# Patient Record
Sex: FEMALE | Race: WHITE | Employment: FULL TIME | URBAN - METROPOLITAN AREA
[De-identification: names, ages, dates, MRNs, and addresses within clinical notes are randomized per-mention and may not be internally consistent; named-entity substitution may affect disease eponyms.]

---

## 2017-02-27 ENCOUNTER — OFFICE VISIT (OUTPATIENT)
Dept: FAMILY MEDICINE CLINIC | Facility: CLINIC | Age: 57
End: 2017-02-27

## 2017-02-27 VITALS
HEIGHT: 64 IN | WEIGHT: 137 LBS | BODY MASS INDEX: 23.39 KG/M2 | TEMPERATURE: 99 F | SYSTOLIC BLOOD PRESSURE: 116 MMHG | HEART RATE: 68 BPM | DIASTOLIC BLOOD PRESSURE: 70 MMHG | RESPIRATION RATE: 18 BRPM

## 2017-02-27 DIAGNOSIS — I10 ESSENTIAL HYPERTENSION: ICD-10-CM

## 2017-02-27 DIAGNOSIS — M75.42 SHOULDER IMPINGEMENT SYNDROME, LEFT: ICD-10-CM

## 2017-02-27 DIAGNOSIS — Z13.0 SCREENING FOR ENDOCRINE, NUTRITIONAL, METABOLIC AND IMMUNITY DISORDER: ICD-10-CM

## 2017-02-27 DIAGNOSIS — Z13.21 SCREENING FOR ENDOCRINE, NUTRITIONAL, METABOLIC AND IMMUNITY DISORDER: ICD-10-CM

## 2017-02-27 DIAGNOSIS — Z13.228 SCREENING FOR ENDOCRINE, NUTRITIONAL, METABOLIC AND IMMUNITY DISORDER: ICD-10-CM

## 2017-02-27 DIAGNOSIS — Z12.31 VISIT FOR SCREENING MAMMOGRAM: ICD-10-CM

## 2017-02-27 DIAGNOSIS — Z13.29 SCREENING FOR ENDOCRINE, NUTRITIONAL, METABOLIC AND IMMUNITY DISORDER: ICD-10-CM

## 2017-02-27 DIAGNOSIS — Z00.00 ROUTINE GENERAL MEDICAL EXAMINATION AT A HEALTH CARE FACILITY: Primary | ICD-10-CM

## 2017-02-27 PROBLEM — M75.40 SHOULDER IMPINGEMENT SYNDROME: Status: ACTIVE | Noted: 2017-02-27

## 2017-02-27 PROCEDURE — 99396 PREV VISIT EST AGE 40-64: CPT | Performed by: FAMILY MEDICINE

## 2017-02-27 PROCEDURE — 99213 OFFICE O/P EST LOW 20 MIN: CPT | Performed by: FAMILY MEDICINE

## 2017-02-27 RX ORDER — VERAPAMIL HYDROCHLORIDE 120 MG/1
CAPSULE, EXTENDED RELEASE ORAL
Qty: 90 CAPSULE | Refills: 3 | Status: SHIPPED | OUTPATIENT
Start: 2017-02-27 | End: 2018-03-08

## 2017-02-27 RX ORDER — METHYLPREDNISOLONE 4 MG/1
TABLET ORAL
Qty: 1 KIT | Refills: 0 | Status: SHIPPED | OUTPATIENT
Start: 2017-02-27 | End: 2018-03-08 | Stop reason: ALTCHOICE

## 2017-02-27 NOTE — PATIENT INSTRUCTIONS
Shoulder Impingement Syndrome  The rotator cuff is a group of muscles and tendons that surround the shoulder joint. These muscles and tendons hold the arm in its joint. They help the shoulder move.  The rotator cuff muscles and tendons can become irritate · You may take acetaminophen or ibuprofen to control pain, unless another medicine was prescribed. If prednisone was prescribed, don’t take anti-inflammatory medicines.  If you have chronic liver or kidney disease or ever had a stomach ulcer or gastrointest

## 2017-02-27 NOTE — PROGRESS NOTES
Vinicio Ordaz is a 64year old female who presents for a complete physical exam, no gyn. HPI:     Patient presents with:  Physical      Patient feels well, dental visit up to date, no hearing problem. Vaccinations up to date.   Needs colonoscopy t any unusual skin lesions or rashes  EYES: no visual complaints or deficits  HEENT: denies nasal congestion, sinus pain or sore throat; hearing loss negative   RESPIRATORY: denies shortness of breath, wheezing or cough   CARDIOVASCULAR: denies chest pain, S visit:    Routine general medical examination at a health care facility    Screening for endocrine, nutritional, metabolic and immunity disorder  -     CBC W/DIFF; Future  -     LIPID PANEL; Future  -     VITAMIN D, 25-HYDROXY; Future  -     TSH;  Future  -

## 2017-03-29 ENCOUNTER — LAB ENCOUNTER (OUTPATIENT)
Dept: LAB | Age: 57
End: 2017-03-29
Attending: FAMILY MEDICINE
Payer: COMMERCIAL

## 2017-03-29 ENCOUNTER — HOSPITAL ENCOUNTER (OUTPATIENT)
Dept: MAMMOGRAPHY | Age: 57
Discharge: HOME | End: 2017-03-29
Attending: FAMILY MEDICINE
Payer: COMMERCIAL

## 2017-03-29 DIAGNOSIS — Z13.29 SCREENING FOR ENDOCRINE, NUTRITIONAL, METABOLIC AND IMMUNITY DISORDER: ICD-10-CM

## 2017-03-29 DIAGNOSIS — Z13.0 SCREENING FOR ENDOCRINE, NUTRITIONAL, METABOLIC AND IMMUNITY DISORDER: ICD-10-CM

## 2017-03-29 DIAGNOSIS — Z13.21 SCREENING FOR ENDOCRINE, NUTRITIONAL, METABOLIC AND IMMUNITY DISORDER: ICD-10-CM

## 2017-03-29 DIAGNOSIS — Z13.228 SCREENING FOR ENDOCRINE, NUTRITIONAL, METABOLIC AND IMMUNITY DISORDER: ICD-10-CM

## 2017-03-29 DIAGNOSIS — Z12.31 VISIT FOR SCREENING MAMMOGRAM: ICD-10-CM

## 2017-03-29 LAB
25-HYDROXYVITAMIN D (TOTAL): 19.7 NG/ML (ref 30–100)
ALBUMIN SERPL-MCNC: 4 G/DL (ref 3.5–4.8)
ALP LIVER SERPL-CCNC: 48 U/L (ref 46–118)
ALT SERPL-CCNC: 21 U/L (ref 14–54)
AST SERPL-CCNC: 21 U/L (ref 15–41)
BASOPHILS # BLD AUTO: 0.09 X10(3) UL (ref 0–0.1)
BASOPHILS NFR BLD AUTO: 1.8 %
BILIRUB SERPL-MCNC: 0.4 MG/DL (ref 0.1–2)
BUN BLD-MCNC: 16 MG/DL (ref 8–20)
CALCIUM BLD-MCNC: 8.9 MG/DL (ref 8.3–10.3)
CHLORIDE: 104 MMOL/L (ref 101–111)
CHOLEST SMN-MCNC: 192 MG/DL (ref ?–200)
CO2: 30 MMOL/L (ref 22–32)
CREAT BLD-MCNC: 0.81 MG/DL (ref 0.55–1.02)
EOSINOPHIL # BLD AUTO: 0.11 X10(3) UL (ref 0–0.3)
EOSINOPHIL NFR BLD AUTO: 2.2 %
ERYTHROCYTE [DISTWIDTH] IN BLOOD BY AUTOMATED COUNT: 14.1 % (ref 11.5–16)
GLUCOSE BLD-MCNC: 79 MG/DL (ref 70–99)
HCT VFR BLD AUTO: 41.6 % (ref 34–50)
HDLC SERPL-MCNC: 115 MG/DL (ref 45–?)
HDLC SERPL: 1.67 {RATIO} (ref ?–4.44)
HGB BLD-MCNC: 13.6 G/DL (ref 12–16)
IMMATURE GRANULOCYTE COUNT: 0.01 X10(3) UL (ref 0–1)
IMMATURE GRANULOCYTE RATIO %: 0.2 %
LDLC SERPL CALC-MCNC: 71 MG/DL (ref ?–130)
LYMPHOCYTES # BLD AUTO: 1.61 X10(3) UL (ref 0.9–4)
LYMPHOCYTES NFR BLD AUTO: 32.8 %
M PROTEIN MFR SERPL ELPH: 7.5 G/DL (ref 6.1–8.3)
MCH RBC QN AUTO: 30.5 PG (ref 27–33.2)
MCHC RBC AUTO-ENTMCNC: 32.7 G/DL (ref 31–37)
MCV RBC AUTO: 93.3 FL (ref 81–100)
MONOCYTES # BLD AUTO: 0.48 X10(3) UL (ref 0.1–0.6)
MONOCYTES NFR BLD AUTO: 9.8 %
NEUTROPHIL ABS PRELIM: 2.61 X10 (3) UL (ref 1.3–6.7)
NEUTROPHILS # BLD AUTO: 2.61 X10(3) UL (ref 1.3–6.7)
NEUTROPHILS NFR BLD AUTO: 53.2 %
NONHDLC SERPL-MCNC: 77 MG/DL (ref ?–130)
PLATELET # BLD AUTO: 257 10(3)UL (ref 150–450)
POTASSIUM SERPL-SCNC: 4.2 MMOL/L (ref 3.6–5.1)
RBC # BLD AUTO: 4.46 X10(6)UL (ref 3.8–5.1)
RED CELL DISTRIBUTION WIDTH-SD: 48.1 FL (ref 35.1–46.3)
SODIUM SERPL-SCNC: 140 MMOL/L (ref 136–144)
TRIGLYCERIDES: 31 MG/DL (ref ?–150)
TSI SER-ACNC: 1.6 MIU/ML (ref 0.35–5.5)
VLDL: 6 MG/DL (ref 5–40)
WBC # BLD AUTO: 4.9 X10(3) UL (ref 4–13)

## 2017-03-29 PROCEDURE — 84443 ASSAY THYROID STIM HORMONE: CPT

## 2017-03-29 PROCEDURE — 85025 COMPLETE CBC W/AUTO DIFF WBC: CPT

## 2017-03-29 PROCEDURE — 82306 VITAMIN D 25 HYDROXY: CPT

## 2017-03-29 PROCEDURE — 80061 LIPID PANEL: CPT

## 2017-03-29 PROCEDURE — 80053 COMPREHEN METABOLIC PANEL: CPT

## 2017-03-29 PROCEDURE — 77067 SCR MAMMO BI INCL CAD: CPT

## 2017-03-29 PROCEDURE — 36415 COLL VENOUS BLD VENIPUNCTURE: CPT

## 2017-03-31 ENCOUNTER — TELEPHONE (OUTPATIENT)
Dept: FAMILY MEDICINE CLINIC | Facility: CLINIC | Age: 57
End: 2017-03-31

## 2017-03-31 RX ORDER — ERGOCALCIFEROL 1.25 MG/1
50000 CAPSULE ORAL WEEKLY
Qty: 12 CAPSULE | Refills: 0 | Status: SHIPPED | OUTPATIENT
Start: 2017-03-31 | End: 2017-04-30

## 2017-03-31 NOTE — TELEPHONE ENCOUNTER
Psr;   Please ask patient what pharmacy she would like her high dose of vitamin D sent to. We show jerry and they are our of business. We will send the RX over once we get this information.   No need to forward to triage unless patient has further que

## 2017-03-31 NOTE — TELEPHONE ENCOUNTER
From voicemail: Pt called back, please send to [facility] [address] [place] [address], [address] [place], [place] [address]o [address] [place] [address], [phone], [phone]

## 2017-03-31 NOTE — TELEPHONE ENCOUNTER
----- Message from Aylin Manriquez MD sent at 3/29/2017  7:50 PM CDT -----  Vitamin D level is low. Please send Rx for 50,000U per week for 3 months.

## 2018-03-08 ENCOUNTER — OFFICE VISIT (OUTPATIENT)
Dept: FAMILY MEDICINE CLINIC | Facility: CLINIC | Age: 58
End: 2018-03-08

## 2018-03-08 VITALS
HEART RATE: 70 BPM | RESPIRATION RATE: 18 BRPM | DIASTOLIC BLOOD PRESSURE: 66 MMHG | SYSTOLIC BLOOD PRESSURE: 116 MMHG | BODY MASS INDEX: 23.56 KG/M2 | TEMPERATURE: 99 F | OXYGEN SATURATION: 99 % | HEIGHT: 64 IN | WEIGHT: 138 LBS

## 2018-03-08 DIAGNOSIS — Z13.228 SCREENING FOR ENDOCRINE, NUTRITIONAL, METABOLIC AND IMMUNITY DISORDER: ICD-10-CM

## 2018-03-08 DIAGNOSIS — Z00.00 ROUTINE GENERAL MEDICAL EXAMINATION AT A HEALTH CARE FACILITY: Primary | ICD-10-CM

## 2018-03-08 DIAGNOSIS — M72.2 PLANTAR FASCIITIS, LEFT: ICD-10-CM

## 2018-03-08 DIAGNOSIS — Z13.0 SCREENING FOR ENDOCRINE, NUTRITIONAL, METABOLIC AND IMMUNITY DISORDER: ICD-10-CM

## 2018-03-08 DIAGNOSIS — Z12.31 VISIT FOR SCREENING MAMMOGRAM: ICD-10-CM

## 2018-03-08 DIAGNOSIS — Z12.11 SCREENING FOR COLON CANCER: ICD-10-CM

## 2018-03-08 DIAGNOSIS — I10 ESSENTIAL HYPERTENSION: ICD-10-CM

## 2018-03-08 DIAGNOSIS — Z13.21 SCREENING FOR ENDOCRINE, NUTRITIONAL, METABOLIC AND IMMUNITY DISORDER: ICD-10-CM

## 2018-03-08 DIAGNOSIS — Z13.29 SCREENING FOR ENDOCRINE, NUTRITIONAL, METABOLIC AND IMMUNITY DISORDER: ICD-10-CM

## 2018-03-08 PROCEDURE — 99213 OFFICE O/P EST LOW 20 MIN: CPT | Performed by: FAMILY MEDICINE

## 2018-03-08 PROCEDURE — 99396 PREV VISIT EST AGE 40-64: CPT | Performed by: FAMILY MEDICINE

## 2018-03-08 PROCEDURE — 20550 NJX 1 TENDON SHEATH/LIGAMENT: CPT | Performed by: FAMILY MEDICINE

## 2018-03-08 RX ORDER — TRIAMCINOLONE ACETONIDE 40 MG/ML
40 INJECTION, SUSPENSION INTRA-ARTICULAR; INTRAMUSCULAR ONCE
Status: COMPLETED | OUTPATIENT
Start: 2018-03-08 | End: 2018-03-08

## 2018-03-08 RX ORDER — VERAPAMIL HYDROCHLORIDE 120 MG/1
CAPSULE, EXTENDED RELEASE ORAL
Qty: 90 CAPSULE | Refills: 4 | Status: SHIPPED | OUTPATIENT
Start: 2018-03-08 | End: 2019-05-09

## 2018-03-08 RX ADMIN — TRIAMCINOLONE ACETONIDE 40 MG: 40 INJECTION, SUSPENSION INTRA-ARTICULAR; INTRAMUSCULAR at 16:51:00

## 2018-03-08 NOTE — PROGRESS NOTES
René Cabrera is a 62year old female who presents for a complete physical exam, no gyn. HPI:     Patient presents with:  Physical      Patient feels well, dental visit up to date, no hearing problem. Vaccinations up to date. Pt has L heel pain. denies nausea, vomiting, constipation, diarrhea; no rectal bleeding; no heartburn  GENITAL/: no dysuria, urgency or frequency  MUSCULOSKELETAL: no other  joint complaints upper or lower extremities  NEURO: no sensory or motor complaint  HEMATOLOGY: denie Lexie Guzmán is a 62year old female who presents for a complete physical exam.   Pt's was recommended low fat diet and aerobic exercise 30 minutes three times weekly. Health maintenance.    Aida Arteaga was seen today for physical.    Diagnoses a no

## 2018-03-09 ENCOUNTER — TELEPHONE (OUTPATIENT)
Dept: FAMILY MEDICINE CLINIC | Facility: CLINIC | Age: 58
End: 2018-03-09

## 2018-03-09 DIAGNOSIS — Z12.11 SCREENING FOR COLON CANCER: Primary | ICD-10-CM

## 2018-03-09 NOTE — TELEPHONE ENCOUNTER
[facility] General Surgery:       Dr. Dollene Mohs    [address] [place] [address]  [place], [address]      [address]  [address]

## 2018-03-09 NOTE — TELEPHONE ENCOUNTER
Justina Morley - specialist name       specialist name    Justina Morley   Sent: Carmita Campos March 09, 2018  9:11 AM   To: Francisco Nieves 17 Clinical Staff                  Message     Good Morning      Specialist name required for authorization      Thank you      Where wou

## 2018-03-09 NOTE — TELEPHONE ENCOUNTER
Pt has seen Dr. Boom Torres for last Colonoscopy. I spoke with pt, she would like to see Dr. Luis Easley again for her Colonoscopy. Referral placed in [facility].

## 2018-03-29 DIAGNOSIS — I10 ESSENTIAL HYPERTENSION: ICD-10-CM

## 2018-03-29 RX ORDER — VERAPAMIL HYDROCHLORIDE 120 MG/1
CAPSULE, EXTENDED RELEASE ORAL
Qty: 90 CAPSULE | Refills: 3 | Status: SHIPPED | OUTPATIENT
Start: 2018-03-29 | End: 2018-05-23 | Stop reason: ALTCHOICE

## 2018-05-17 ENCOUNTER — LAB ENCOUNTER (OUTPATIENT)
Dept: LAB | Age: 58
End: 2018-05-17
Attending: FAMILY MEDICINE
Payer: COMMERCIAL

## 2018-05-17 DIAGNOSIS — Z13.0 SCREENING FOR ENDOCRINE, NUTRITIONAL, METABOLIC AND IMMUNITY DISORDER: ICD-10-CM

## 2018-05-17 DIAGNOSIS — Z00.00 ROUTINE GENERAL MEDICAL EXAMINATION AT A HEALTH CARE FACILITY: ICD-10-CM

## 2018-05-17 DIAGNOSIS — Z13.29 SCREENING FOR ENDOCRINE, NUTRITIONAL, METABOLIC AND IMMUNITY DISORDER: ICD-10-CM

## 2018-05-17 DIAGNOSIS — Z13.21 SCREENING FOR ENDOCRINE, NUTRITIONAL, METABOLIC AND IMMUNITY DISORDER: ICD-10-CM

## 2018-05-17 DIAGNOSIS — Z13.228 SCREENING FOR ENDOCRINE, NUTRITIONAL, METABOLIC AND IMMUNITY DISORDER: ICD-10-CM

## 2018-05-17 PROCEDURE — 86803 HEPATITIS C AB TEST: CPT

## 2018-05-17 PROCEDURE — 82306 VITAMIN D 25 HYDROXY: CPT

## 2018-05-17 PROCEDURE — 36415 COLL VENOUS BLD VENIPUNCTURE: CPT

## 2018-05-17 PROCEDURE — 80061 LIPID PANEL: CPT

## 2018-05-17 PROCEDURE — 84443 ASSAY THYROID STIM HORMONE: CPT

## 2018-05-17 PROCEDURE — 85025 COMPLETE CBC W/AUTO DIFF WBC: CPT

## 2018-05-17 PROCEDURE — 80053 COMPREHEN METABOLIC PANEL: CPT

## 2018-05-23 ENCOUNTER — OFFICE VISIT (OUTPATIENT)
Dept: FAMILY MEDICINE CLINIC | Facility: CLINIC | Age: 58
End: 2018-05-23

## 2018-05-23 VITALS
WEIGHT: 137 LBS | RESPIRATION RATE: 18 BRPM | OXYGEN SATURATION: 99 % | BODY MASS INDEX: 23.39 KG/M2 | HEART RATE: 68 BPM | SYSTOLIC BLOOD PRESSURE: 116 MMHG | DIASTOLIC BLOOD PRESSURE: 68 MMHG | TEMPERATURE: 99 F | HEIGHT: 64 IN

## 2018-05-23 DIAGNOSIS — M72.2 PLANTAR FASCIITIS OF LEFT FOOT: Primary | ICD-10-CM

## 2018-05-23 PROCEDURE — 99214 OFFICE O/P EST MOD 30 MIN: CPT | Performed by: FAMILY MEDICINE

## 2018-05-23 RX ORDER — METHYLPREDNISOLONE 4 MG/1
TABLET ORAL
Qty: 1 KIT | Refills: 0 | Status: SHIPPED | OUTPATIENT
Start: 2018-05-23 | End: 2019-05-28

## 2018-05-23 NOTE — PROGRESS NOTES
Patient presents with: Foot Pain: Lt Heel pain       HPI: Pt has L  heel pain.  Pt had for months, steroid shot helped a lot ,now mild to moderate, getting worse, sharp pain, worse in the morning and better during the day, worse with bearing weight and bet of insertion of plantar fascia on calcaneous of L foot. Miriam Gonzalez was seen today for foot pain.     Diagnoses and all orders for this visit:    Plantar fasciitis of left foot  -     PHYSICAL THERAPY EXTERNAL  -     methylPREDNISolone (MEDROL) 4

## 2019-05-01 ENCOUNTER — PATIENT OUTREACH (OUTPATIENT)
Dept: FAMILY MEDICINE CLINIC | Facility: CLINIC | Age: 59
End: 2019-05-01

## 2019-05-09 DIAGNOSIS — I10 ESSENTIAL HYPERTENSION: ICD-10-CM

## 2019-05-09 RX ORDER — VERAPAMIL HYDROCHLORIDE 120 MG/1
CAPSULE, EXTENDED RELEASE ORAL
Qty: 90 CAPSULE | Refills: 0 | Status: SHIPPED | OUTPATIENT
Start: 2019-05-09 | End: 2019-08-07

## 2019-05-09 NOTE — TELEPHONE ENCOUNTER
Medication(s) to Refill:   Requested Prescriptions     Pending Prescriptions Disp Refills   • VERAPAMIL HCL  MG Oral Capsule SR 24 Hr [Pharmacy Med Name: VERAPAMIL 120MG SR CAPSULES] 90 capsule 0     Sig: TAKE ONE CAPSULE BY MOUTH NIGHTLY         Olivier Adam

## 2019-05-28 ENCOUNTER — OFFICE VISIT (OUTPATIENT)
Dept: FAMILY MEDICINE CLINIC | Facility: CLINIC | Age: 59
End: 2019-05-28

## 2019-05-28 VITALS
HEIGHT: 64 IN | WEIGHT: 135 LBS | DIASTOLIC BLOOD PRESSURE: 80 MMHG | SYSTOLIC BLOOD PRESSURE: 110 MMHG | OXYGEN SATURATION: 98 % | RESPIRATION RATE: 16 BRPM | HEART RATE: 72 BPM | BODY MASS INDEX: 23.05 KG/M2 | TEMPERATURE: 99 F

## 2019-05-28 DIAGNOSIS — K12.2 UVULITIS: ICD-10-CM

## 2019-05-28 DIAGNOSIS — J02.9 ACUTE PHARYNGITIS, UNSPECIFIED ETIOLOGY: ICD-10-CM

## 2019-05-28 DIAGNOSIS — J02.9 SORE THROAT: Primary | ICD-10-CM

## 2019-05-28 PROCEDURE — 87081 CULTURE SCREEN ONLY: CPT | Performed by: NURSE PRACTITIONER

## 2019-05-28 PROCEDURE — 99213 OFFICE O/P EST LOW 20 MIN: CPT | Performed by: NURSE PRACTITIONER

## 2019-05-28 PROCEDURE — 87880 STREP A ASSAY W/OPTIC: CPT | Performed by: NURSE PRACTITIONER

## 2019-05-28 RX ORDER — AMOXICILLIN AND CLAVULANATE POTASSIUM 875; 125 MG/1; MG/1
1 TABLET, FILM COATED ORAL 2 TIMES DAILY
Qty: 14 TABLET | Refills: 0 | Status: SHIPPED | OUTPATIENT
Start: 2019-05-28 | End: 2019-06-04

## 2019-05-28 RX ORDER — IBUPROFEN 800 MG/1
800 TABLET ORAL EVERY 8 HOURS PRN
Qty: 60 TABLET | Refills: 0 | Status: SHIPPED | OUTPATIENT
Start: 2019-05-28 | End: 2019-08-14

## 2019-05-28 RX ORDER — PREDNISONE 20 MG/1
40 TABLET ORAL DAILY
Qty: 10 TABLET | Refills: 0 | Status: SHIPPED | OUTPATIENT
Start: 2019-05-28 | End: 2019-06-02

## 2019-05-28 NOTE — PROGRESS NOTES
CHIEF COMPLAINT:   Patient presents with:  Sore Throat      HPI:   Cuca Mijares is a 62year old female presents to clinic with symptoms of sore throat. Sore throat:  Patient has had for 3 days. Symptoms have been  worsening  since the onset.   Nadia without murmur  GI: good BS's,no masses, hepatosplenomegaly, or tenderness on direct palpation  EXTREMITIES: no cyanosis, clubbing or edema  LYMPH: mild tenderness to  anterior cervical lymph nodes . No  submandibular lymphadenopathy.   No posterior cervica

## 2019-08-07 DIAGNOSIS — I10 ESSENTIAL HYPERTENSION: ICD-10-CM

## 2019-08-12 RX ORDER — VERAPAMIL HYDROCHLORIDE 120 MG/1
CAPSULE, EXTENDED RELEASE ORAL
Qty: 90 CAPSULE | Refills: 0 | Status: SHIPPED | OUTPATIENT
Start: 2019-08-12 | End: 2019-08-14

## 2019-08-14 ENCOUNTER — OFFICE VISIT (OUTPATIENT)
Dept: FAMILY MEDICINE CLINIC | Facility: CLINIC | Age: 59
End: 2019-08-14

## 2019-08-14 VITALS
OXYGEN SATURATION: 98 % | HEART RATE: 64 BPM | SYSTOLIC BLOOD PRESSURE: 110 MMHG | WEIGHT: 132 LBS | BODY MASS INDEX: 22.53 KG/M2 | DIASTOLIC BLOOD PRESSURE: 80 MMHG | HEIGHT: 64 IN | RESPIRATION RATE: 16 BRPM

## 2019-08-14 DIAGNOSIS — Z12.39 SCREENING FOR BREAST CANCER: ICD-10-CM

## 2019-08-14 DIAGNOSIS — Z13.21 ENCOUNTER FOR VITAMIN DEFICIENCY SCREENING: ICD-10-CM

## 2019-08-14 DIAGNOSIS — Z00.00 ROUTINE GENERAL MEDICAL EXAMINATION AT A HEALTH CARE FACILITY: Primary | ICD-10-CM

## 2019-08-14 DIAGNOSIS — Z00.00 LABORATORY EXAM ORDERED AS PART OF ROUTINE GENERAL MEDICAL EXAMINATION: ICD-10-CM

## 2019-08-14 DIAGNOSIS — I10 ESSENTIAL HYPERTENSION: ICD-10-CM

## 2019-08-14 PROCEDURE — 99396 PREV VISIT EST AGE 40-64: CPT | Performed by: NURSE PRACTITIONER

## 2019-08-14 RX ORDER — VERAPAMIL HYDROCHLORIDE 120 MG/1
120 CAPSULE, EXTENDED RELEASE ORAL NIGHTLY
Qty: 90 CAPSULE | Refills: 3 | Status: SHIPPED | OUTPATIENT
Start: 2019-08-14 | End: 2020-12-07

## 2019-08-14 NOTE — PROGRESS NOTES
[facility]   PROGRESS NOTE  Chief Complaint:   Patient presents with:  Physical      HPI:   This is a 62year old female coming in for Physical   Physical: Patient 62 y female presents with comprehensive physical. Reports doing well , denies any dryness. CARDIOVASCULAR:  Denies chest pain, chest pressure, chest discomfort, palpitations, edema, dyspnea on exertion or at rest.  RESPIRATORY:  Denies shortness of breath, wheezing, cough or sputum.   GASTROINTESTINAL:  Denies abdominal pain, nausea, vo visit:  Diagnoses and all orders for this visit:    Routine general medical examination at a health care facility  Physical activity -30 min of exercise 3-5 days per week   Eat healthy diet that consists of whole grain , fruits , vegetables , lean meats . Essential hypertension     Routine general medical examination at a health care facility     Shoulder impingement syndrome     Plantar fasciitis of left foot      LIANE Bolanos  8/14/2019  4:15 PM

## 2020-06-24 ENCOUNTER — HOSPITAL ENCOUNTER (OUTPATIENT)
Dept: MAMMOGRAPHY | Age: 60
Discharge: HOME | End: 2020-06-24
Attending: NURSE PRACTITIONER
Payer: COMMERCIAL

## 2020-06-24 DIAGNOSIS — Z12.39 SCREENING FOR BREAST CANCER: ICD-10-CM

## 2020-06-24 PROCEDURE — 77067 SCR MAMMO BI INCL CAD: CPT | Performed by: NURSE PRACTITIONER

## 2020-06-24 PROCEDURE — 77063 BREAST TOMOSYNTHESIS BI: CPT | Performed by: NURSE PRACTITIONER

## 2020-09-23 ENCOUNTER — PATIENT MESSAGE (OUTPATIENT)
Dept: FAMILY MEDICINE CLINIC | Facility: CLINIC | Age: 60
End: 2020-09-23

## 2020-09-24 ENCOUNTER — ANESTHESIA EVENT (OUTPATIENT)
Dept: SURGERY | Facility: HOSPITAL | Age: 60
End: 2020-09-24
Payer: COMMERCIAL

## 2020-09-24 ENCOUNTER — HOSPITAL ENCOUNTER (EMERGENCY)
Facility: HOSPITAL | Age: 60
Discharge: HOME | End: 2020-09-24
Attending: EMERGENCY MEDICINE | Admitting: EMERGENCY MEDICINE
Payer: COMMERCIAL

## 2020-09-24 ENCOUNTER — APPOINTMENT (OUTPATIENT)
Dept: CT IMAGING | Age: 60
End: 2020-09-24
Attending: EMERGENCY MEDICINE
Payer: COMMERCIAL

## 2020-09-24 ENCOUNTER — ANESTHESIA (OUTPATIENT)
Dept: SURGERY | Facility: HOSPITAL | Age: 60
End: 2020-09-24
Payer: COMMERCIAL

## 2020-09-24 ENCOUNTER — APPOINTMENT (OUTPATIENT)
Dept: GENERAL RADIOLOGY | Age: 60
End: 2020-09-24
Attending: EMERGENCY MEDICINE
Payer: COMMERCIAL

## 2020-09-24 ENCOUNTER — APPOINTMENT (OUTPATIENT)
Dept: GENERAL RADIOLOGY | Facility: HOSPITAL | Age: 60
End: 2020-09-24
Attending: SURGERY
Payer: COMMERCIAL

## 2020-09-24 VITALS
DIASTOLIC BLOOD PRESSURE: 74 MMHG | OXYGEN SATURATION: 100 % | HEART RATE: 93 BPM | BODY MASS INDEX: 22.32 KG/M2 | RESPIRATION RATE: 18 BRPM | SYSTOLIC BLOOD PRESSURE: 127 MMHG | TEMPERATURE: 100 F | WEIGHT: 130.75 LBS | HEIGHT: 64 IN

## 2020-09-24 DIAGNOSIS — K81.9 CHOLECYSTITIS: ICD-10-CM

## 2020-09-24 DIAGNOSIS — K81.0 ACUTE CHOLECYSTITIS: Primary | ICD-10-CM

## 2020-09-24 PROBLEM — R73.9 HYPERGLYCEMIA: Status: ACTIVE | Noted: 2020-09-24

## 2020-09-24 LAB
ALBUMIN SERPL-MCNC: 3.9 G/DL (ref 3.4–5)
ALBUMIN/GLOB SERPL: 1 {RATIO} (ref 1–2)
ALP LIVER SERPL-CCNC: 43 U/L
ALT SERPL-CCNC: 19 U/L
ANION GAP SERPL CALC-SCNC: 6 MMOL/L (ref 0–18)
AST SERPL-CCNC: 19 U/L (ref 15–37)
ATRIAL RATE: 78 BPM
BASOPHILS # BLD AUTO: 0.07 X10(3) UL (ref 0–0.2)
BASOPHILS NFR BLD AUTO: 1 %
BILIRUB SERPL-MCNC: 0.5 MG/DL (ref 0.1–2)
BUN BLD-MCNC: 9 MG/DL (ref 7–18)
BUN/CREAT SERPL: 12 (ref 10–20)
CALCIUM BLD-MCNC: 8.8 MG/DL (ref 8.5–10.1)
CHLORIDE SERPL-SCNC: 107 MMOL/L (ref 98–112)
CO2 SERPL-SCNC: 26 MMOL/L (ref 21–32)
CREAT BLD-MCNC: 0.75 MG/DL
DEPRECATED RDW RBC AUTO: 42.5 FL (ref 35.1–46.3)
EOSINOPHIL # BLD AUTO: 0.04 X10(3) UL (ref 0–0.7)
EOSINOPHIL NFR BLD AUTO: 0.6 %
ERYTHROCYTE [DISTWIDTH] IN BLOOD BY AUTOMATED COUNT: 13 % (ref 11–15)
GLOBULIN PLAS-MCNC: 3.8 G/DL (ref 2.8–4.4)
GLUCOSE BLD-MCNC: 111 MG/DL (ref 70–99)
HCT VFR BLD AUTO: 38.3 %
HGB BLD-MCNC: 13 G/DL
IMM GRANULOCYTES # BLD AUTO: 0.01 X10(3) UL (ref 0–1)
IMM GRANULOCYTES NFR BLD: 0.1 %
LIPASE SERPL-CCNC: 184 U/L (ref 73–393)
LYMPHOCYTES # BLD AUTO: 1.38 X10(3) UL (ref 1–4)
LYMPHOCYTES NFR BLD AUTO: 20.2 %
M PROTEIN MFR SERPL ELPH: 7.7 G/DL (ref 6.4–8.2)
MCH RBC QN AUTO: 30.4 PG (ref 26–34)
MCHC RBC AUTO-ENTMCNC: 33.9 G/DL (ref 31–37)
MCV RBC AUTO: 89.5 FL
MONOCYTES # BLD AUTO: 0.53 X10(3) UL (ref 0.1–1)
MONOCYTES NFR BLD AUTO: 7.8 %
NEUTROPHILS # BLD AUTO: 4.8 X10 (3) UL (ref 1.5–7.7)
NEUTROPHILS # BLD AUTO: 4.8 X10(3) UL (ref 1.5–7.7)
NEUTROPHILS NFR BLD AUTO: 70.3 %
OSMOLALITY SERPL CALC.SUM OF ELEC: 287 MOSM/KG (ref 275–295)
P AXIS: 75 DEGREES
P-R INTERVAL: 146 MS
PLATELET # BLD AUTO: 239 10(3)UL (ref 150–450)
POTASSIUM SERPL-SCNC: 3.9 MMOL/L (ref 3.5–5.1)
Q-T INTERVAL: 378 MS
QRS DURATION: 86 MS
QTC CALCULATION (BEZET): 430 MS
R AXIS: 73 DEGREES
RBC # BLD AUTO: 4.28 X10(6)UL
SARS-COV-2 RNA RESP QL NAA+PROBE: NOT DETECTED
SODIUM SERPL-SCNC: 139 MMOL/L (ref 136–145)
T AXIS: 56 DEGREES
TROPONIN I SERPL-MCNC: <0.045 NG/ML (ref ?–0.04)
VENTRICULAR RATE: 78 BPM
WBC # BLD AUTO: 6.8 X10(3) UL (ref 4–11)

## 2020-09-24 PROCEDURE — 47563 LAPARO CHOLECYSTECTOMY/GRAPH: CPT | Performed by: PHYSICIAN ASSISTANT

## 2020-09-24 PROCEDURE — 71045 X-RAY EXAM CHEST 1 VIEW: CPT | Performed by: EMERGENCY MEDICINE

## 2020-09-24 PROCEDURE — 0FT44ZZ RESECTION OF GALLBLADDER, PERCUTANEOUS ENDOSCOPIC APPROACH: ICD-10-PCS | Performed by: SURGERY

## 2020-09-24 PROCEDURE — 47563 LAPARO CHOLECYSTECTOMY/GRAPH: CPT | Performed by: SURGERY

## 2020-09-24 PROCEDURE — BF141ZZ FLUOROSCOPY OF GALLBLADDER, BILE DUCTS AND PANCREATIC DUCTS USING LOW OSMOLAR CONTRAST: ICD-10-PCS | Performed by: SURGERY

## 2020-09-24 PROCEDURE — 74177 CT ABD & PELVIS W/CONTRAST: CPT | Performed by: EMERGENCY MEDICINE

## 2020-09-24 PROCEDURE — 99284 EMERGENCY DEPT VISIT MOD MDM: CPT | Performed by: SURGERY

## 2020-09-24 PROCEDURE — 76000 FLUOROSCOPY <1 HR PHYS/QHP: CPT | Performed by: SURGERY

## 2020-09-24 RX ORDER — NALOXONE HYDROCHLORIDE 0.4 MG/ML
80 INJECTION, SOLUTION INTRAMUSCULAR; INTRAVENOUS; SUBCUTANEOUS AS NEEDED
Status: DISCONTINUED | OUTPATIENT
Start: 2020-09-24 | End: 2020-09-24

## 2020-09-24 RX ORDER — DIPHENHYDRAMINE HYDROCHLORIDE 50 MG/ML
12.5 INJECTION INTRAMUSCULAR; INTRAVENOUS AS NEEDED
Status: DISCONTINUED | OUTPATIENT
Start: 2020-09-24 | End: 2020-09-24

## 2020-09-24 RX ORDER — SODIUM CHLORIDE 9 MG/ML
INJECTION, SOLUTION INTRAVENOUS CONTINUOUS
Status: CANCELLED | OUTPATIENT
Start: 2020-09-24 | End: 2020-09-24

## 2020-09-24 RX ORDER — HEPARIN SODIUM 5000 [USP'U]/ML
INJECTION, SOLUTION INTRAVENOUS; SUBCUTANEOUS
Status: COMPLETED
Start: 2020-09-24 | End: 2020-09-24

## 2020-09-24 RX ORDER — SODIUM CHLORIDE, SODIUM LACTATE, POTASSIUM CHLORIDE, CALCIUM CHLORIDE 600; 310; 30; 20 MG/100ML; MG/100ML; MG/100ML; MG/100ML
INJECTION, SOLUTION INTRAVENOUS CONTINUOUS
Status: DISCONTINUED | OUTPATIENT
Start: 2020-09-24 | End: 2020-09-24

## 2020-09-24 RX ORDER — ACETAMINOPHEN 500 MG
1000 TABLET ORAL ONCE
Status: COMPLETED | OUTPATIENT
Start: 2020-09-24 | End: 2020-09-24

## 2020-09-24 RX ORDER — ONDANSETRON 2 MG/ML
INJECTION INTRAMUSCULAR; INTRAVENOUS AS NEEDED
Status: DISCONTINUED | OUTPATIENT
Start: 2020-09-24 | End: 2020-09-24 | Stop reason: SURG

## 2020-09-24 RX ORDER — MEPERIDINE HYDROCHLORIDE 25 MG/ML
INJECTION INTRAMUSCULAR; INTRAVENOUS; SUBCUTANEOUS
Status: COMPLETED
Start: 2020-09-24 | End: 2020-09-24

## 2020-09-24 RX ORDER — METOCLOPRAMIDE HYDROCHLORIDE 5 MG/ML
INJECTION INTRAMUSCULAR; INTRAVENOUS
Status: COMPLETED
Start: 2020-09-24 | End: 2020-09-24

## 2020-09-24 RX ORDER — KETOROLAC TROMETHAMINE 30 MG/ML
INJECTION, SOLUTION INTRAMUSCULAR; INTRAVENOUS AS NEEDED
Status: DISCONTINUED | OUTPATIENT
Start: 2020-09-24 | End: 2020-09-24 | Stop reason: SURG

## 2020-09-24 RX ORDER — CEFOXITIN 2 G/1
INJECTION, POWDER, FOR SOLUTION INTRAVENOUS AS NEEDED
Status: DISCONTINUED | OUTPATIENT
Start: 2020-09-24 | End: 2020-09-24 | Stop reason: SURG

## 2020-09-24 RX ORDER — BUPIVACAINE HYDROCHLORIDE AND EPINEPHRINE 5; 5 MG/ML; UG/ML
INJECTION, SOLUTION EPIDURAL; INTRACAUDAL; PERINEURAL AS NEEDED
Status: DISCONTINUED | OUTPATIENT
Start: 2020-09-24 | End: 2020-09-24 | Stop reason: HOSPADM

## 2020-09-24 RX ORDER — HYDROMORPHONE HYDROCHLORIDE 1 MG/ML
0.5 INJECTION, SOLUTION INTRAMUSCULAR; INTRAVENOUS; SUBCUTANEOUS EVERY 30 MIN PRN
Status: CANCELLED | OUTPATIENT
Start: 2020-09-24 | End: 2020-09-24

## 2020-09-24 RX ORDER — HYDROCODONE BITARTRATE AND ACETAMINOPHEN 5; 325 MG/1; MG/1
1 TABLET ORAL AS NEEDED
Status: DISCONTINUED | OUTPATIENT
Start: 2020-09-24 | End: 2020-09-24

## 2020-09-24 RX ORDER — LABETALOL HYDROCHLORIDE 5 MG/ML
5 INJECTION, SOLUTION INTRAVENOUS EVERY 5 MIN PRN
Status: DISCONTINUED | OUTPATIENT
Start: 2020-09-24 | End: 2020-09-24

## 2020-09-24 RX ORDER — GLYCOPYRROLATE 0.2 MG/ML
INJECTION, SOLUTION INTRAMUSCULAR; INTRAVENOUS AS NEEDED
Status: DISCONTINUED | OUTPATIENT
Start: 2020-09-24 | End: 2020-09-24 | Stop reason: SURG

## 2020-09-24 RX ORDER — SCOLOPAMINE TRANSDERMAL SYSTEM 1 MG/1
PATCH, EXTENDED RELEASE TRANSDERMAL AS NEEDED
Status: DISCONTINUED | OUTPATIENT
Start: 2020-09-24 | End: 2020-09-24 | Stop reason: SURG

## 2020-09-24 RX ORDER — METOCLOPRAMIDE HYDROCHLORIDE 5 MG/ML
10 INJECTION INTRAMUSCULAR; INTRAVENOUS AS NEEDED
Status: DISCONTINUED | OUTPATIENT
Start: 2020-09-24 | End: 2020-09-24

## 2020-09-24 RX ORDER — HYDROMORPHONE HYDROCHLORIDE 1 MG/ML
INJECTION, SOLUTION INTRAMUSCULAR; INTRAVENOUS; SUBCUTANEOUS
Status: COMPLETED
Start: 2020-09-24 | End: 2020-09-24

## 2020-09-24 RX ORDER — HYDROCODONE BITARTRATE AND ACETAMINOPHEN 5; 325 MG/1; MG/1
2 TABLET ORAL AS NEEDED
Status: DISCONTINUED | OUTPATIENT
Start: 2020-09-24 | End: 2020-09-24

## 2020-09-24 RX ORDER — NEOSTIGMINE METHYLSULFATE 1 MG/ML
INJECTION INTRAVENOUS AS NEEDED
Status: DISCONTINUED | OUTPATIENT
Start: 2020-09-24 | End: 2020-09-24 | Stop reason: SURG

## 2020-09-24 RX ORDER — ONDANSETRON 2 MG/ML
4 INJECTION INTRAMUSCULAR; INTRAVENOUS EVERY 4 HOURS PRN
Status: CANCELLED | OUTPATIENT
Start: 2020-09-24

## 2020-09-24 RX ORDER — ONDANSETRON 2 MG/ML
4 INJECTION INTRAMUSCULAR; INTRAVENOUS AS NEEDED
Status: DISCONTINUED | OUTPATIENT
Start: 2020-09-24 | End: 2020-09-24

## 2020-09-24 RX ORDER — HYDROCODONE BITARTRATE AND ACETAMINOPHEN 5; 325 MG/1; MG/1
1 TABLET ORAL EVERY 6 HOURS PRN
Qty: 20 TABLET | Refills: 0 | Status: SHIPPED | OUTPATIENT
Start: 2020-09-24 | End: 2020-10-01 | Stop reason: ALTCHOICE

## 2020-09-24 RX ORDER — LIDOCAINE HYDROCHLORIDE 40 MG/ML
INJECTION, SOLUTION RETROBULBAR; TOPICAL AS NEEDED
Status: DISCONTINUED | OUTPATIENT
Start: 2020-09-24 | End: 2020-09-24 | Stop reason: SURG

## 2020-09-24 RX ORDER — DEXAMETHASONE SODIUM PHOSPHATE 4 MG/ML
VIAL (ML) INJECTION AS NEEDED
Status: DISCONTINUED | OUTPATIENT
Start: 2020-09-24 | End: 2020-09-24 | Stop reason: SURG

## 2020-09-24 RX ORDER — MEPERIDINE HYDROCHLORIDE 25 MG/ML
12.5 INJECTION INTRAMUSCULAR; INTRAVENOUS; SUBCUTANEOUS AS NEEDED
Status: DISCONTINUED | OUTPATIENT
Start: 2020-09-24 | End: 2020-09-24

## 2020-09-24 RX ORDER — ROCURONIUM BROMIDE 10 MG/ML
INJECTION, SOLUTION INTRAVENOUS AS NEEDED
Status: DISCONTINUED | OUTPATIENT
Start: 2020-09-24 | End: 2020-09-24 | Stop reason: SURG

## 2020-09-24 RX ORDER — ONDANSETRON 2 MG/ML
INJECTION INTRAMUSCULAR; INTRAVENOUS
Status: COMPLETED
Start: 2020-09-24 | End: 2020-09-24

## 2020-09-24 RX ORDER — HYDROMORPHONE HYDROCHLORIDE 1 MG/ML
0.4 INJECTION, SOLUTION INTRAMUSCULAR; INTRAVENOUS; SUBCUTANEOUS EVERY 5 MIN PRN
Status: DISCONTINUED | OUTPATIENT
Start: 2020-09-24 | End: 2020-09-24

## 2020-09-24 RX ORDER — HEPARIN SODIUM 5000 [USP'U]/ML
5000 INJECTION, SOLUTION INTRAVENOUS; SUBCUTANEOUS ONCE
Status: DISCONTINUED | OUTPATIENT
Start: 2020-09-24 | End: 2020-09-24 | Stop reason: HOSPADM

## 2020-09-24 RX ADMIN — CEFOXITIN 2 G: 2 INJECTION, POWDER, FOR SOLUTION INTRAVENOUS at 15:58:00

## 2020-09-24 RX ADMIN — KETOROLAC TROMETHAMINE 30 MG: 30 INJECTION, SOLUTION INTRAMUSCULAR; INTRAVENOUS at 17:04:00

## 2020-09-24 RX ADMIN — SODIUM CHLORIDE, SODIUM LACTATE, POTASSIUM CHLORIDE, CALCIUM CHLORIDE: 600; 310; 30; 20 INJECTION, SOLUTION INTRAVENOUS at 17:01:00

## 2020-09-24 RX ADMIN — DEXAMETHASONE SODIUM PHOSPHATE 8 MG: 4 MG/ML VIAL (ML) INJECTION at 15:52:00

## 2020-09-24 RX ADMIN — LIDOCAINE HYDROCHLORIDE 100 MG: 40 INJECTION, SOLUTION RETROBULBAR; TOPICAL at 15:48:00

## 2020-09-24 RX ADMIN — ROCURONIUM BROMIDE 40 MG: 10 INJECTION, SOLUTION INTRAVENOUS at 15:48:00

## 2020-09-24 RX ADMIN — NEOSTIGMINE METHYLSULFATE 4 MG: 1 INJECTION INTRAVENOUS at 17:05:00

## 2020-09-24 RX ADMIN — ONDANSETRON 4 MG: 2 INJECTION INTRAMUSCULAR; INTRAVENOUS at 17:04:00

## 2020-09-24 RX ADMIN — SODIUM CHLORIDE, SODIUM LACTATE, POTASSIUM CHLORIDE, CALCIUM CHLORIDE: 600; 310; 30; 20 INJECTION, SOLUTION INTRAVENOUS at 17:02:00

## 2020-09-24 RX ADMIN — SCOLOPAMINE TRANSDERMAL SYSTEM 1 PATCH: 1 PATCH, EXTENDED RELEASE TRANSDERMAL at 15:49:00

## 2020-09-24 RX ADMIN — GLYCOPYRROLATE 0.7 MG: 0.2 INJECTION, SOLUTION INTRAMUSCULAR; INTRAVENOUS at 17:05:00

## 2020-09-24 NOTE — ANESTHESIA POSTPROCEDURE EVALUATION
[address] [facility] Patient Status:  Emergency   Age/Gender 61year old female MRN [id number]   Location [facility] Attending Susan Ritchie MD   Hosp Day # 0 PCP Rocco Diaz MD       Anesthesia Pos

## 2020-09-24 NOTE — ED NOTES
Pt to go directly to OR holding per nursing supervisor. Pt requests that her  drive her to [facility] Dr. Radha Best. Pt and family updated on POC. IV abx infusing at this time.

## 2020-09-24 NOTE — ED PROVIDER NOTES
Patient Seen in: [facility] [place]      History   Patient presents with:  Abdomen/Flank Pain    Stated Complaint: epigastric pain    HPI    This is a very pleasant 51-year-old female who appears younger than her stated age who pre Temp 98.3 °F (36.8 °C) (Temporal)   Resp 20   Ht 162.6 cm (5' 4\")   Wt 59.3 kg   SpO2 98%   BMI 22.44 kg/m²         Physical Exam  General: This a pleasant nontoxic appearing patient in no apparent distress alert and oriented ×3  HEENT: Pupils are equal r normal EKG           Narrative & Impression     PROCEDURE:  XR CHEST AP PORTABLE  (CPT=71045)     TECHNIQUE:  AP chest radiograph was obtained. COMPARISON:  None.      INDICATIONS:  epigastric pain     PATIENT STATED HISTORY: (As transcribed by Burak Jett A representative cyst in the right kidney measures 5 mm. Bilateral extrarenal collecting systems demonstrate fullness. ADRENALS:  No mass or enlargement. AORTA/VASCULAR:  No aneurysm or dissection. RETROPERITONEUM:  No mass or adenopathy.     BOW showed no acute pathology. There is no pneumothorax or infiltrative process. CAT scan showed abnormal gallbladder fundal wall thickening. There were calcified gallstones in the gallbladder noted. Cholecystitis cannot be excluded.   I spoke with the patsy

## 2020-09-24 NOTE — ANESTHESIA PREPROCEDURE EVALUATION
PRE-OP EVALUATION    Patient Name: Idania Wilson    Pre-op Diagnosis: Cholecystitis [K81.9]    Procedure(s):  LAPAROSCOPIC CHOLECYSTECTOMY WITH INTRAOPERATIVE CHOLANGIOGRAM    Surgeon(s) and Role:     * Eileen Ferris MD - Primary    Pre-op vital PRN    •  ondansetron HCl (ZOFRAN) injection 4 mg, 4 mg, Intravenous, PRN    •  Metoclopramide HCl (REGLAN) injection 10 mg, 10 mg, Intravenous, PRN    •  Trimethobenzamide HCl (TIGAN) injection 200 mg, 200 mg, Intramuscular, PRN    •  cefOXitin Sodium (ME Neuro/Psych                   (+) headaches                 Past Surgical History:   Procedure Laterality Date   • COLONOSCOPY  2013    Dr. Jazzmine Barahona   • HYSTERECTOMY  1/1/96   • TONEY BIOPSY STEREO NODULE 1 SITE LEFT (CPT=19081)  2014    benign   • TONEY BIOPSY ST

## 2020-09-24 NOTE — ANESTHESIA PROCEDURE NOTES
Airway  Date/Time: 9/24/2020 3:48 PM  Urgency: elective      General Information and Staff    Patient location during procedure: OR  Anesthesiologist: Reji Donohue MD    Indications and Patient Condition  Indications for airway management: anesthesia

## 2020-09-24 NOTE — OPERATIVE REPORT
OPERATIVE REPORT   PREOPERATIVE DIAGNOSIS: Cholecystitis and cholelithiasis. POSTOPERATIVE DIAGNOSIS: Cholecystitis and cholelithiasis.    PROCEDURE PERFORMED: Laparoscopic cholecystectomy with intraoperative cholangiogram.   ASSISTANT: Ms Crystal Redman. conduct of this case, particularly in the performance of the cholangiogram, as well as the careful dissection necessary in and around the hilum of the gallbladder.    DESCRIPTION OF PROCEDURE: The patient was brought to the operating room, and, after the in artery on the specimen side, three towards the patient side, and the cystic artery was divided with the Endoshears. Next, the gallbladder was elevated from the gallbladder fossa using electrocautery.  Once the gallbladder was elevated from the gallbladder f

## 2020-09-24 NOTE — BRIEF OP NOTE
Pre-Operative Diagnosis: Acute Cholecystitis      Post-Operative Diagnosis: Acute Cholecystitis       Procedure Performed:   Procedure(s):  LAPAROSCOPIC CHOLECYSTECTOMY WITH INTRAOPERATIVE CHOLANGIOGRAM    Surgeon(s) and Role:     * Marlena Johnson MD -

## 2020-09-24 NOTE — TELEPHONE ENCOUNTER
From: René Cabrera  To: Isidoro Alvarez MD  Sent: 9/23/2020 1:51 PM CDT  Subject: Non-Urgent Medical Question    I was trying to make an appt. for the near future with you and the first opening isn't until December.  I am experiencing uncomfortable

## 2020-09-24 NOTE — H&P
5 [facility] Patient Status:  Emergency    1960 MRN [id number]   Location [address] [place] [address] Attending Dominguez Aguilar MD   Hosp Day # 0 PCP Rocco Diaz MD     History of Pres Cancer Maternal Grandmother 79   • Other (chronic kidney disease) Sister       reports that she has never smoked. She has never used smokeless tobacco. She reports that she does not drink alcohol or use drugs.     Allergies:    Anesthesia S-I-40 [*        H  09/24/2020    CO2 26.0 09/24/2020     09/24/2020    CA 8.8 09/24/2020    ALB 3.9 09/24/2020    ALKPHO 43 09/24/2020    BILT 0.5 09/24/2020    TP 7.7 09/24/2020    AST 19 09/24/2020    ALT 19 09/24/2020     09/24/2020    TROP <0.045

## 2020-10-01 ENCOUNTER — VIRTUAL PHONE E/M (OUTPATIENT)
Dept: SURGERY | Facility: CLINIC | Age: 60
End: 2020-10-01

## 2020-10-01 DIAGNOSIS — K81.0 ACUTE CHOLECYSTITIS: Primary | ICD-10-CM

## 2020-10-01 PROCEDURE — 99024 POSTOP FOLLOW-UP VISIT: CPT | Performed by: PHYSICIAN ASSISTANT

## 2020-10-01 NOTE — PROGRESS NOTES
Virtual/Telephone Check-In    Munir Napier verbally consents to a Virtual/Telephone Check-In service on 10/01/20. Patient has been referred to the [facility] website at [email or web address] to review the yearly Consent to Treat document.     I contact an as-needed basis. This patient should return urgently for any problems or complications related to the surgical intervention. The patient voiced understanding and agreement with this plan.     Debra Del Rosario, PA-C  10/1/2020

## 2020-10-21 ENCOUNTER — PATIENT MESSAGE (OUTPATIENT)
Dept: SURGERY | Facility: CLINIC | Age: 60
End: 2020-10-21

## 2020-10-21 RX ORDER — CHOLESTYRAMINE 4 G/9G
4 POWDER, FOR SUSPENSION ORAL DAILY
Qty: 30 PACKET | Refills: 2 | Status: SHIPPED | OUTPATIENT
Start: 2020-10-21

## 2020-10-21 NOTE — TELEPHONE ENCOUNTER
Gita Pittman  You 3 hours ago (11:25 AM)     Deborrcalvin Duran,     Go ahead and prescribe her Questran. Can you please let her know that this is in the same family as the medication she mentioned, but this is our preferred medication to start with.  She can ha

## 2020-10-21 NOTE — TELEPHONE ENCOUNTER
From: Alana Sayres  To: OUMAR Childs  Sent: 10/21/2020 8:41 AM CDT  Subject: Prescription Question    I had my gallbladder removed on 9- and am having some issues with frequent bowel movements and sometimes it is diarrhea.  I feel reall

## 2020-12-07 DIAGNOSIS — I10 ESSENTIAL HYPERTENSION: ICD-10-CM

## 2020-12-07 RX ORDER — VERAPAMIL HYDROCHLORIDE 120 MG/1
120 CAPSULE, EXTENDED RELEASE ORAL NIGHTLY
Qty: 90 CAPSULE | Refills: 0 | Status: SHIPPED | OUTPATIENT
Start: 2020-12-07 | End: 2021-03-11

## 2020-12-07 NOTE — TELEPHONE ENCOUNTER
Medication(s) to Refill:   Requested Prescriptions     Pending Prescriptions Disp Refills   • Verapamil HCl  MG Oral Capsule SR 24 Hr 90 capsule 0     Sig: Take 1 capsule (120 mg total) by mouth nightly.          Reason for Medication Refill being sen

## 2021-03-11 DIAGNOSIS — I10 ESSENTIAL HYPERTENSION: ICD-10-CM

## 2021-03-11 RX ORDER — VERAPAMIL HYDROCHLORIDE 120 MG/1
CAPSULE, EXTENDED RELEASE ORAL
Qty: 90 CAPSULE | Refills: 0 | Status: SHIPPED | OUTPATIENT
Start: 2021-03-11 | End: 2021-06-15

## 2021-03-11 NOTE — TELEPHONE ENCOUNTER
Medication(s) to Refill:   Requested Prescriptions     Pending Prescriptions Disp Refills   • VERAPAMIL HCL  MG Oral Capsule SR 24 Hr [Pharmacy Med Name: VERAPAMIL 120MG ER CAPSULES] 90 capsule 0     Sig: TAKE 1 CAPSULE(120 MG) BY MOUTH EVERY NIGHT

## 2021-06-14 DIAGNOSIS — I10 ESSENTIAL HYPERTENSION: ICD-10-CM

## 2021-06-15 RX ORDER — VERAPAMIL HYDROCHLORIDE 120 MG/1
CAPSULE, EXTENDED RELEASE ORAL
Qty: 90 CAPSULE | Refills: 0 | Status: SHIPPED | OUTPATIENT
Start: 2021-06-15 | End: 2021-09-29

## 2021-09-13 DIAGNOSIS — I10 ESSENTIAL HYPERTENSION: ICD-10-CM

## 2021-09-13 RX ORDER — VERAPAMIL HYDROCHLORIDE 120 MG/1
CAPSULE, EXTENDED RELEASE ORAL
Qty: 90 CAPSULE | Refills: 0 | OUTPATIENT
Start: 2021-09-13

## 2021-09-13 NOTE — TELEPHONE ENCOUNTER
Pt has not been seen since 8/14/2019. Medication denied. Please have her schedule appt to refill mediation. Thank you!

## 2021-09-14 DIAGNOSIS — I10 ESSENTIAL HYPERTENSION: ICD-10-CM

## 2021-09-14 RX ORDER — VERAPAMIL HYDROCHLORIDE 120 MG/1
120 CAPSULE, EXTENDED RELEASE ORAL NIGHTLY
Qty: 90 CAPSULE | Refills: 0 | OUTPATIENT
Start: 2021-09-14

## 2021-09-29 ENCOUNTER — OFFICE VISIT (OUTPATIENT)
Dept: FAMILY MEDICINE CLINIC | Facility: CLINIC | Age: 61
End: 2021-09-29

## 2021-09-29 VITALS
OXYGEN SATURATION: 97 % | WEIGHT: 135 LBS | SYSTOLIC BLOOD PRESSURE: 120 MMHG | RESPIRATION RATE: 16 BRPM | DIASTOLIC BLOOD PRESSURE: 78 MMHG | HEIGHT: 64 IN | HEART RATE: 68 BPM | BODY MASS INDEX: 23.05 KG/M2

## 2021-09-29 DIAGNOSIS — Z12.31 VISIT FOR SCREENING MAMMOGRAM: ICD-10-CM

## 2021-09-29 DIAGNOSIS — Z13.29 SCREENING FOR ENDOCRINE, NUTRITIONAL, METABOLIC AND IMMUNITY DISORDER: ICD-10-CM

## 2021-09-29 DIAGNOSIS — Z00.00 ROUTINE GENERAL MEDICAL EXAMINATION AT A HEALTH CARE FACILITY: Primary | ICD-10-CM

## 2021-09-29 DIAGNOSIS — Z13.0 SCREENING FOR ENDOCRINE, NUTRITIONAL, METABOLIC AND IMMUNITY DISORDER: ICD-10-CM

## 2021-09-29 DIAGNOSIS — Z13.21 SCREENING FOR ENDOCRINE, NUTRITIONAL, METABOLIC AND IMMUNITY DISORDER: ICD-10-CM

## 2021-09-29 DIAGNOSIS — Z13.228 SCREENING FOR ENDOCRINE, NUTRITIONAL, METABOLIC AND IMMUNITY DISORDER: ICD-10-CM

## 2021-09-29 DIAGNOSIS — I10 ESSENTIAL HYPERTENSION: ICD-10-CM

## 2021-09-29 PROCEDURE — 3008F BODY MASS INDEX DOCD: CPT | Performed by: FAMILY MEDICINE

## 2021-09-29 PROCEDURE — 3078F DIAST BP <80 MM HG: CPT | Performed by: FAMILY MEDICINE

## 2021-09-29 PROCEDURE — 3074F SYST BP LT 130 MM HG: CPT | Performed by: FAMILY MEDICINE

## 2021-09-29 PROCEDURE — 99396 PREV VISIT EST AGE 40-64: CPT | Performed by: FAMILY MEDICINE

## 2021-09-29 RX ORDER — VERAPAMIL HYDROCHLORIDE 120 MG/1
120 CAPSULE, EXTENDED RELEASE ORAL NIGHTLY
Qty: 90 CAPSULE | Refills: 3 | Status: SHIPPED | OUTPATIENT
Start: 2021-09-29

## 2021-09-29 NOTE — PROGRESS NOTES
Reynold Vargas is a 61year old female who presents for a complete physical exam, no gyn. HPI:     Patient presents with:  Physical      Patient feels well, dental visit up to date, no hearing problem. Exercise: three times per week.   Diet: wa edema,orthopnea, no palpitations   GI: denies nausea, vomiting, constipation, diarrhea; no rectal bleeding; no heartburn  GENITAL/: no dysuria, urgency or frequency  MUSCULOSKELETAL: no joint complaints upper or lower extremities  NEURO: no sensory or mo for screening mammogram  -     [facility] PERFECTO 2D+3D SCREENING BILAT (CPT=77067/48160); Future; Expected date: 09/29/2021  4. Screening for endocrine, nutritional, metabolic and immunity disorder  -     Comp Metabolic Panel (14);  Future; Expected date: 09/29/2021

## 2022-04-22 ENCOUNTER — HOSPITAL ENCOUNTER (OUTPATIENT)
Dept: MAMMOGRAPHY | Age: 62
Discharge: HOME | End: 2022-04-22
Attending: FAMILY MEDICINE
Payer: COMMERCIAL

## 2022-04-22 ENCOUNTER — TELEPHONE (OUTPATIENT)
Dept: FAMILY MEDICINE CLINIC | Facility: CLINIC | Age: 62
End: 2022-04-22

## 2022-04-22 DIAGNOSIS — Z12.31 VISIT FOR SCREENING MAMMOGRAM: ICD-10-CM

## 2022-04-22 PROCEDURE — 77063 BREAST TOMOSYNTHESIS BI: CPT | Performed by: FAMILY MEDICINE

## 2022-04-22 PROCEDURE — 77067 SCR MAMMO BI INCL CAD: CPT | Performed by: FAMILY MEDICINE

## 2022-10-19 DIAGNOSIS — I10 ESSENTIAL HYPERTENSION: ICD-10-CM

## 2022-10-19 RX ORDER — VERAPAMIL HYDROCHLORIDE 120 MG/1
CAPSULE, EXTENDED RELEASE ORAL
Qty: 30 CAPSULE | Refills: 0 | Status: SHIPPED | OUTPATIENT
Start: 2022-10-19

## 2022-12-12 DIAGNOSIS — I10 ESSENTIAL HYPERTENSION: ICD-10-CM

## 2022-12-13 DIAGNOSIS — I10 ESSENTIAL HYPERTENSION: ICD-10-CM

## 2022-12-13 RX ORDER — VERAPAMIL HYDROCHLORIDE 120 MG/1
CAPSULE, EXTENDED RELEASE ORAL
Qty: 90 CAPSULE | Refills: 0 | OUTPATIENT
Start: 2022-12-13

## 2022-12-13 RX ORDER — VERAPAMIL HYDROCHLORIDE 120 MG/1
120 CAPSULE, EXTENDED RELEASE ORAL NIGHTLY
Qty: 30 CAPSULE | Refills: 0 | Status: SHIPPED | OUTPATIENT
Start: 2022-12-13

## 2023-01-04 ENCOUNTER — OFFICE VISIT (OUTPATIENT)
Dept: FAMILY MEDICINE CLINIC | Facility: CLINIC | Age: 63
End: 2023-01-04
Payer: COMMERCIAL

## 2023-01-04 VITALS
HEART RATE: 72 BPM | BODY MASS INDEX: 24.24 KG/M2 | SYSTOLIC BLOOD PRESSURE: 124 MMHG | RESPIRATION RATE: 16 BRPM | HEIGHT: 64 IN | OXYGEN SATURATION: 96 % | WEIGHT: 142 LBS | DIASTOLIC BLOOD PRESSURE: 82 MMHG

## 2023-01-04 DIAGNOSIS — I10 ESSENTIAL HYPERTENSION: ICD-10-CM

## 2023-01-04 DIAGNOSIS — Z12.31 VISIT FOR SCREENING MAMMOGRAM: ICD-10-CM

## 2023-01-04 DIAGNOSIS — Z13.228 SCREENING FOR ENDOCRINE, NUTRITIONAL, METABOLIC AND IMMUNITY DISORDER: ICD-10-CM

## 2023-01-04 DIAGNOSIS — Z13.29 SCREENING FOR ENDOCRINE, NUTRITIONAL, METABOLIC AND IMMUNITY DISORDER: ICD-10-CM

## 2023-01-04 DIAGNOSIS — Z13.0 SCREENING FOR ENDOCRINE, NUTRITIONAL, METABOLIC AND IMMUNITY DISORDER: ICD-10-CM

## 2023-01-04 DIAGNOSIS — Z23 NEED FOR SHINGLES VACCINE: ICD-10-CM

## 2023-01-04 DIAGNOSIS — Z00.00 ROUTINE GENERAL MEDICAL EXAMINATION AT A HEALTH CARE FACILITY: Primary | ICD-10-CM

## 2023-01-04 DIAGNOSIS — Z13.21 SCREENING FOR ENDOCRINE, NUTRITIONAL, METABOLIC AND IMMUNITY DISORDER: ICD-10-CM

## 2023-01-04 PROCEDURE — 99396 PREV VISIT EST AGE 40-64: CPT | Performed by: FAMILY MEDICINE

## 2023-01-04 PROCEDURE — 3008F BODY MASS INDEX DOCD: CPT | Performed by: FAMILY MEDICINE

## 2023-01-04 PROCEDURE — 90750 HZV VACC RECOMBINANT IM: CPT | Performed by: FAMILY MEDICINE

## 2023-01-04 PROCEDURE — 90471 IMMUNIZATION ADMIN: CPT | Performed by: FAMILY MEDICINE

## 2023-01-04 PROCEDURE — 3079F DIAST BP 80-89 MM HG: CPT | Performed by: FAMILY MEDICINE

## 2023-01-04 PROCEDURE — 3074F SYST BP LT 130 MM HG: CPT | Performed by: FAMILY MEDICINE

## 2023-01-04 RX ORDER — VERAPAMIL HYDROCHLORIDE 120 MG/1
120 CAPSULE, EXTENDED RELEASE ORAL NIGHTLY
Qty: 90 CAPSULE | Refills: 4 | Status: SHIPPED | OUTPATIENT
Start: 2023-01-04 | End: 2023-04-04

## 2023-02-08 ENCOUNTER — LAB ENCOUNTER (OUTPATIENT)
Dept: LAB | Age: 63
End: 2023-02-08
Attending: FAMILY MEDICINE
Payer: COMMERCIAL

## 2023-02-08 ENCOUNTER — OFFICE VISIT (OUTPATIENT)
Dept: FAMILY MEDICINE CLINIC | Facility: CLINIC | Age: 63
End: 2023-02-08
Payer: COMMERCIAL

## 2023-02-08 VITALS
HEIGHT: 64 IN | BODY MASS INDEX: 24.24 KG/M2 | HEART RATE: 87 BPM | DIASTOLIC BLOOD PRESSURE: 80 MMHG | WEIGHT: 142 LBS | OXYGEN SATURATION: 98 % | SYSTOLIC BLOOD PRESSURE: 132 MMHG | RESPIRATION RATE: 16 BRPM

## 2023-02-08 DIAGNOSIS — G43.719 INTRACTABLE CHRONIC MIGRAINE WITHOUT AURA AND WITHOUT STATUS MIGRAINOSUS: ICD-10-CM

## 2023-02-08 DIAGNOSIS — Z13.0 SCREENING FOR ENDOCRINE, NUTRITIONAL, METABOLIC AND IMMUNITY DISORDER: ICD-10-CM

## 2023-02-08 DIAGNOSIS — Z13.29 SCREENING FOR ENDOCRINE, NUTRITIONAL, METABOLIC AND IMMUNITY DISORDER: ICD-10-CM

## 2023-02-08 DIAGNOSIS — Z13.228 SCREENING FOR ENDOCRINE, NUTRITIONAL, METABOLIC AND IMMUNITY DISORDER: ICD-10-CM

## 2023-02-08 DIAGNOSIS — M54.2 CERVICALGIA: ICD-10-CM

## 2023-02-08 DIAGNOSIS — R20.0 NUMBNESS: ICD-10-CM

## 2023-02-08 DIAGNOSIS — M62.81 MUSCLE WEAKNESS: Primary | ICD-10-CM

## 2023-02-08 DIAGNOSIS — Z13.21 SCREENING FOR ENDOCRINE, NUTRITIONAL, METABOLIC AND IMMUNITY DISORDER: ICD-10-CM

## 2023-02-08 DIAGNOSIS — M62.81 MUSCLE WEAKNESS: ICD-10-CM

## 2023-02-08 LAB
ALBUMIN SERPL-MCNC: 3.9 G/DL (ref 3.4–5)
ALBUMIN/GLOB SERPL: 1 {RATIO} (ref 1–2)
ALP LIVER SERPL-CCNC: 57 U/L
ALT SERPL-CCNC: 23 U/L
ANION GAP SERPL CALC-SCNC: 5 MMOL/L (ref 0–18)
AST SERPL-CCNC: 29 U/L (ref 15–37)
BASOPHILS # BLD AUTO: 0.11 X10(3) UL (ref 0–0.2)
BASOPHILS NFR BLD AUTO: 1.6 %
BILIRUB SERPL-MCNC: 0.2 MG/DL (ref 0.1–2)
BILIRUB UR QL STRIP.AUTO: NEGATIVE
BUN BLD-MCNC: 11 MG/DL (ref 7–18)
CALCIUM BLD-MCNC: 9 MG/DL (ref 8.5–10.1)
CHLORIDE SERPL-SCNC: 106 MMOL/L (ref 98–112)
CHOLEST SERPL-MCNC: 161 MG/DL (ref ?–200)
CLARITY UR REFRACT.AUTO: CLEAR
CO2 SERPL-SCNC: 29 MMOL/L (ref 21–32)
COLOR UR AUTO: YELLOW
CREAT BLD-MCNC: 0.89 MG/DL
EOSINOPHIL # BLD AUTO: 0.1 X10(3) UL (ref 0–0.7)
EOSINOPHIL NFR BLD AUTO: 1.5 %
ERYTHROCYTE [DISTWIDTH] IN BLOOD BY AUTOMATED COUNT: 13.7 %
ERYTHROCYTE [SEDIMENTATION RATE] IN BLOOD: 15 MM/HR
FASTING PATIENT LIPID ANSWER: YES
FASTING STATUS PATIENT QL REPORTED: YES
GFR SERPLBLD BASED ON 1.73 SQ M-ARVRAT: 73 ML/MIN/1.73M2 (ref 60–?)
GLOBULIN PLAS-MCNC: 3.8 G/DL (ref 2.8–4.4)
GLUCOSE BLD-MCNC: 117 MG/DL (ref 70–99)
GLUCOSE UR STRIP.AUTO-MCNC: NEGATIVE MG/DL
HCT VFR BLD AUTO: 38.5 %
HDLC SERPL-MCNC: 77 MG/DL (ref 40–59)
HGB BLD-MCNC: 12.3 G/DL
IMM GRANULOCYTES # BLD AUTO: 0.01 X10(3) UL (ref 0–1)
IMM GRANULOCYTES NFR BLD: 0.1 %
KETONES UR STRIP.AUTO-MCNC: NEGATIVE MG/DL
LDLC SERPL CALC-MCNC: 54 MG/DL (ref ?–100)
LYMPHOCYTES # BLD AUTO: 1.89 X10(3) UL (ref 1–4)
LYMPHOCYTES NFR BLD AUTO: 28.3 %
MCH RBC QN AUTO: 28.3 PG (ref 26–34)
MCHC RBC AUTO-ENTMCNC: 31.9 G/DL (ref 31–37)
MCV RBC AUTO: 88.5 FL
MONOCYTES # BLD AUTO: 0.55 X10(3) UL (ref 0.1–1)
MONOCYTES NFR BLD AUTO: 8.2 %
NEUTROPHILS # BLD AUTO: 4.03 X10 (3) UL (ref 1.5–7.7)
NEUTROPHILS # BLD AUTO: 4.03 X10(3) UL (ref 1.5–7.7)
NEUTROPHILS NFR BLD AUTO: 60.3 %
NITRITE UR QL STRIP.AUTO: NEGATIVE
NONHDLC SERPL-MCNC: 84 MG/DL (ref ?–130)
OSMOLALITY SERPL CALC.SUM OF ELEC: 290 MOSM/KG (ref 275–295)
PH UR STRIP.AUTO: 6.5 [PH] (ref 5–8)
PLATELET # BLD AUTO: 259 10(3)UL (ref 150–450)
POTASSIUM SERPL-SCNC: 4.2 MMOL/L (ref 3.5–5.1)
PROT SERPL-MCNC: 7.7 G/DL (ref 6.4–8.2)
PROT UR STRIP.AUTO-MCNC: NEGATIVE MG/DL
RBC # BLD AUTO: 4.35 X10(6)UL
RHEUMATOID FACT SERPL-ACNC: <10 IU/ML (ref ?–15)
SODIUM SERPL-SCNC: 140 MMOL/L (ref 136–145)
SP GR UR STRIP.AUTO: 1.02 (ref 1–1.03)
TRIGL SERPL-MCNC: 185 MG/DL (ref 30–149)
TSI SER-ACNC: 1.58 MIU/ML (ref 0.36–3.74)
URATE SERPL-MCNC: 4.8 MG/DL
UROBILINOGEN UR STRIP.AUTO-MCNC: 0.2 MG/DL
VIT B12 SERPL-MCNC: 484 PG/ML (ref 193–986)
VIT D+METAB SERPL-MCNC: 16.9 NG/ML (ref 30–100)
VLDLC SERPL CALC-MCNC: 27 MG/DL (ref 0–30)
WBC # BLD AUTO: 6.7 X10(3) UL (ref 4–11)

## 2023-02-08 PROCEDURE — 3008F BODY MASS INDEX DOCD: CPT | Performed by: FAMILY MEDICINE

## 2023-02-08 PROCEDURE — 86235 NUCLEAR ANTIGEN ANTIBODY: CPT

## 2023-02-08 PROCEDURE — 86431 RHEUMATOID FACTOR QUANT: CPT

## 2023-02-08 PROCEDURE — 84550 ASSAY OF BLOOD/URIC ACID: CPT

## 2023-02-08 PROCEDURE — 82607 VITAMIN B-12: CPT

## 2023-02-08 PROCEDURE — 99214 OFFICE O/P EST MOD 30 MIN: CPT | Performed by: FAMILY MEDICINE

## 2023-02-08 PROCEDURE — 81001 URINALYSIS AUTO W/SCOPE: CPT

## 2023-02-08 PROCEDURE — 80061 LIPID PANEL: CPT

## 2023-02-08 PROCEDURE — 85025 COMPLETE CBC W/AUTO DIFF WBC: CPT

## 2023-02-08 PROCEDURE — 86618 LYME DISEASE ANTIBODY: CPT

## 2023-02-08 PROCEDURE — 84443 ASSAY THYROID STIM HORMONE: CPT

## 2023-02-08 PROCEDURE — 86200 CCP ANTIBODY: CPT

## 2023-02-08 PROCEDURE — 80053 COMPREHEN METABOLIC PANEL: CPT

## 2023-02-08 PROCEDURE — 3079F DIAST BP 80-89 MM HG: CPT | Performed by: FAMILY MEDICINE

## 2023-02-08 PROCEDURE — 83519 RIA NONANTIBODY: CPT

## 2023-02-08 PROCEDURE — 36415 COLL VENOUS BLD VENIPUNCTURE: CPT

## 2023-02-08 PROCEDURE — 85652 RBC SED RATE AUTOMATED: CPT

## 2023-02-08 PROCEDURE — 82306 VITAMIN D 25 HYDROXY: CPT

## 2023-02-08 PROCEDURE — 84080 ASSAY ALKALINE PHOSPHATASES: CPT

## 2023-02-08 PROCEDURE — 3075F SYST BP GE 130 - 139MM HG: CPT | Performed by: FAMILY MEDICINE

## 2023-02-08 PROCEDURE — 81015 MICROSCOPIC EXAM OF URINE: CPT

## 2023-02-08 NOTE — PATIENT INSTRUCTIONS
Dr. Retana [facility]    Neurology  [facility]    Phone: [phone]  Fax: [address]  [address]  [address]  [place], [address]    Phone: [phone]

## 2023-02-09 LAB
BONE SPECIFIC ALKALINE PHOSPHATASE: 7.8 UG/L
CCP IGG SERPL-ACNC: 0.7 U/ML (ref 0–6.9)
ENA SCL70 IGG SER IA-ACNC: <0.6 U/ML

## 2023-02-10 LAB
ACETYLCHOLINE BINDING AB: 0 NMOL/L
B BURGDOR IGG+IGM SER QL: NEGATIVE

## 2023-02-11 ENCOUNTER — TELEPHONE (OUTPATIENT)
Dept: FAMILY MEDICINE CLINIC | Facility: CLINIC | Age: 63
End: 2023-02-11

## 2023-02-11 DIAGNOSIS — R30.0 DYSURIA: Primary | ICD-10-CM

## 2023-02-11 RX ORDER — ERGOCALCIFEROL 1.25 MG/1
50000 CAPSULE ORAL WEEKLY
Qty: 4 CAPSULE | Refills: 2 | Status: SHIPPED | OUTPATIENT
Start: 2023-02-11 | End: 2023-03-13

## 2023-02-11 NOTE — TELEPHONE ENCOUNTER
----- Message from Kylie Loredo MD sent at 2/10/2023 10:11 AM CST -----  Urine looks contaminated not concern, ok to recheck it with culture in two weeks or so

## 2023-02-17 ENCOUNTER — TELEPHONE (OUTPATIENT)
Dept: FAMILY MEDICINE CLINIC | Facility: CLINIC | Age: 63
End: 2023-02-17

## 2023-02-17 ENCOUNTER — HOSPITAL ENCOUNTER (OUTPATIENT)
Dept: MRI IMAGING | Age: 63
Discharge: HOME | End: 2023-02-17
Attending: FAMILY MEDICINE
Payer: COMMERCIAL

## 2023-02-17 DIAGNOSIS — G43.719 INTRACTABLE CHRONIC MIGRAINE WITHOUT AURA AND WITHOUT STATUS MIGRAINOSUS: ICD-10-CM

## 2023-02-17 DIAGNOSIS — R20.0 NUMBNESS: ICD-10-CM

## 2023-02-17 DIAGNOSIS — M54.2 CERVICALGIA: ICD-10-CM

## 2023-02-17 DIAGNOSIS — M62.81 MUSCLE WEAKNESS: ICD-10-CM

## 2023-02-17 DIAGNOSIS — D18.00 VENOUS ANGIOMA: Primary | ICD-10-CM

## 2023-02-17 DIAGNOSIS — M47.812 CERVICAL SPONDYLOSIS: ICD-10-CM

## 2023-02-17 PROCEDURE — 72141 MRI NECK SPINE W/O DYE: CPT | Performed by: FAMILY MEDICINE

## 2023-02-17 PROCEDURE — A9575 INJ GADOTERATE MEGLUMI 0.1ML: HCPCS | Performed by: FAMILY MEDICINE

## 2023-02-17 PROCEDURE — 70553 MRI BRAIN STEM W/O & W/DYE: CPT | Performed by: FAMILY MEDICINE

## 2023-02-17 RX ORDER — GADOTERATE MEGLUMINE 376.9 MG/ML
15 INJECTION INTRAVENOUS
Status: COMPLETED | OUTPATIENT
Start: 2023-02-17 | End: 2023-02-17

## 2023-02-17 RX ADMIN — GADOTERATE MEGLUMINE 14 ML: 376.9 INJECTION INTRAVENOUS at 07:45:00

## 2023-02-17 NOTE — TELEPHONE ENCOUNTER
----- Message from Uziel Roberts MD sent at 2/17/2023 11:48 AM CST -----  Normal, small venous angioma in frontal lobe, I would see neurosurgeon for second opinion, most likely nothing to worry about but we need second opinion. Dr. Javi Jay  Neurosurgery    [address]  [address] [facility], [address] [facility]     [address] [facility]  [address] [place] [address],[address], [address] [facility]  Phone: [phone]   Fax: [phone]

## 2023-02-17 NOTE — TELEPHONE ENCOUNTER
----- Message from Moris Dejesus MD sent at 2/17/2023 10:51 AM CST -----  Pt has lots of disc problems, with mild compression on spinal cord, ok to see asap our neurosurgery group, Dr. Katja Merritt.     Neurosurgery    [address] [place] [address]   Phone: [phone]   Fax: [phone]     mychart to pt and referral in system

## 2023-02-18 NOTE — TELEPHONE ENCOUNTER
I do not think so but ok to see Dr. Bernardino Ruvalcaba who is migraine specialist for second opinion.     Dr. Melton Eye    Neurology  [facility]    Phone: [phone]  Fax: [address]  [address]  [address]  [place], [address]    Phone: [phone]

## 2023-02-18 NOTE — TELEPHONE ENCOUNTER
I dont think so but I think will be nice to have neurology opinion. Dr. Nidia Darby is migraine specialist.  Dr. Johnsno [facility]    Neurology  [facility]    Phone: [phone]  Fax: [address]  [address]  [address]  [place], [address]    Phone: [phone]

## 2023-02-24 ENCOUNTER — OFFICE VISIT (OUTPATIENT)
Dept: FAMILY MEDICINE CLINIC | Facility: CLINIC | Age: 63
End: 2023-02-24
Payer: COMMERCIAL

## 2023-02-24 VITALS
OXYGEN SATURATION: 100 % | SYSTOLIC BLOOD PRESSURE: 138 MMHG | BODY MASS INDEX: 23.9 KG/M2 | WEIGHT: 140 LBS | DIASTOLIC BLOOD PRESSURE: 85 MMHG | RESPIRATION RATE: 18 BRPM | HEIGHT: 64 IN | HEART RATE: 66 BPM

## 2023-02-24 DIAGNOSIS — N30.00 ACUTE CYSTITIS WITHOUT HEMATURIA: Primary | ICD-10-CM

## 2023-02-24 DIAGNOSIS — R35.0 FREQUENCY OF URINATION: ICD-10-CM

## 2023-02-24 LAB
APPEARANCE: CLEAR
BILIRUBIN: NEGATIVE
GLUCOSE (URINE DIPSTICK): NEGATIVE MG/DL
KETONES (URINE DIPSTICK): NEGATIVE MG/DL
MULTISTIX LOT#: ABNORMAL NUMERIC
NITRITE, URINE: NEGATIVE
PH, URINE: 7 (ref 4.5–8)
PROTEIN (URINE DIPSTICK): NEGATIVE MG/DL
SPECIFIC GRAVITY: 1.01 (ref 1–1.03)
UROBILINOGEN,SEMI-QN: 0.2 MG/DL (ref 0–1.9)

## 2023-02-24 PROCEDURE — 87147 CULTURE TYPE IMMUNOLOGIC: CPT | Performed by: NURSE PRACTITIONER

## 2023-02-24 PROCEDURE — 3079F DIAST BP 80-89 MM HG: CPT | Performed by: NURSE PRACTITIONER

## 2023-02-24 PROCEDURE — 87086 URINE CULTURE/COLONY COUNT: CPT | Performed by: NURSE PRACTITIONER

## 2023-02-24 PROCEDURE — 3075F SYST BP GE 130 - 139MM HG: CPT | Performed by: NURSE PRACTITIONER

## 2023-02-24 PROCEDURE — 81003 URINALYSIS AUTO W/O SCOPE: CPT | Performed by: NURSE PRACTITIONER

## 2023-02-24 PROCEDURE — 3008F BODY MASS INDEX DOCD: CPT | Performed by: NURSE PRACTITIONER

## 2023-02-24 PROCEDURE — 99213 OFFICE O/P EST LOW 20 MIN: CPT | Performed by: NURSE PRACTITIONER

## 2023-02-24 RX ORDER — NITROFURANTOIN 25; 75 MG/1; MG/1
100 CAPSULE ORAL 2 TIMES DAILY
Qty: 14 CAPSULE | Refills: 0 | Status: SHIPPED | OUTPATIENT
Start: 2023-02-24 | End: 2023-03-03

## 2023-04-13 ENCOUNTER — OFFICE VISIT (OUTPATIENT)
Dept: NEUROLOGY | Facility: CLINIC | Age: 63
End: 2023-04-13
Payer: COMMERCIAL

## 2023-04-13 VITALS — RESPIRATION RATE: 16 BRPM | DIASTOLIC BLOOD PRESSURE: 78 MMHG | SYSTOLIC BLOOD PRESSURE: 128 MMHG | HEART RATE: 70 BPM

## 2023-04-13 DIAGNOSIS — R25.1 TREMOR: Primary | ICD-10-CM

## 2023-04-13 PROCEDURE — 3078F DIAST BP <80 MM HG: CPT | Performed by: OTHER

## 2023-04-13 PROCEDURE — 99204 OFFICE O/P NEW MOD 45 MIN: CPT | Performed by: OTHER

## 2023-04-13 PROCEDURE — 3074F SYST BP LT 130 MM HG: CPT | Performed by: OTHER

## 2023-04-13 RX ORDER — VERAPAMIL HYDROCHLORIDE 120 MG/1
TABLET, FILM COATED ORAL DAILY
COMMUNITY
Start: 2002-01-11 | End: 2023-04-13 | Stop reason: ALTCHOICE

## 2023-04-13 NOTE — PROGRESS NOTES
Patient here for evaluation of tremors in left hand on/off and interruption of movement with muscles in legs for the last 2 months.

## 2023-05-28 ENCOUNTER — PATIENT MESSAGE (OUTPATIENT)
Dept: FAMILY MEDICINE CLINIC | Facility: CLINIC | Age: 63
End: 2023-05-28

## 2023-10-16 ENCOUNTER — OFFICE VISIT (OUTPATIENT)
Dept: NEUROLOGY | Facility: CLINIC | Age: 63
End: 2023-10-16
Payer: COMMERCIAL

## 2023-10-16 VITALS — RESPIRATION RATE: 16 BRPM | HEART RATE: 70 BPM | SYSTOLIC BLOOD PRESSURE: 120 MMHG | DIASTOLIC BLOOD PRESSURE: 80 MMHG

## 2023-10-16 DIAGNOSIS — R25.1 TREMOR: Primary | ICD-10-CM

## 2023-10-16 PROCEDURE — 3079F DIAST BP 80-89 MM HG: CPT | Performed by: OTHER

## 2023-10-16 PROCEDURE — 3074F SYST BP LT 130 MM HG: CPT | Performed by: OTHER

## 2023-10-16 PROCEDURE — 99214 OFFICE O/P EST MOD 30 MIN: CPT | Performed by: OTHER

## 2023-10-16 NOTE — PROGRESS NOTES
[facility] Neurology Outpatient Progress Note  Date of service: 10/16/2023    Assessment:   Tremor  (primary encounter diagnosis); not symptomatic at this time, ? Essential tremor  Cervical degenerative disease, multilevel, with canal stenosis   Family history of PD     Plan:  Reviewed MRI brain , c spine  Spine surgeon consultation recommended  Observe tremor, consider meds only if worse  See orders and medications filed with this encounter. The patient indicates understanding of these issues and agrees with the plan. Discussed with patient regarding assessment, care plan  RTC 6-9 months  Pt should go ER for any new or worsening symptoms and contact office     Subjective:   History:  Patient here for a follow-up visit for tremor. Since last visit tremor is unchanged, occasionally noted more tremor when writing. Per initial visit note:  Liza Williamson is a 58year old female with past medical history as listed below presents here for initial evaluation of tremor, leg incoordination. Patient here for evaluation of tremors in left hand on/off and interruption of movement with muscles in legs for the last 2 months. her mother had PD and dementia before passing away, father has Myasthenia gravis but doing well at age [de-identified],  passed away due to pancreatic cancer. Admits increased stress in life. History/Other:   REVIEW OF SYSTEMS:  A 10-point system was reviewed.  Pertinent positives and negatives are noted as above       Current Outpatient Medications:     verapamil  MG Oral Tab CR, Take 1 tablet (120 mg total) by mouth nightly., Disp: , Rfl:   Allergies:  No Known Allergies  Past Medical History:   Diagnosis Date    Hemorrhoids     Migraine     Migraines 2000     Past Surgical History:   Procedure Laterality Date    COLONOSCOPY  2013    Dr. Indy Foster  1/1/96    TONEY BIOPSY STEREO NODULE 1 SITE LEFT (CPT=19081)  2014    benign    TONEY BIOPSY STEREO NODULE 2 SITE BILAT (CPT=19081/69593) Left 2015    Benign    OTHER  1/1/89    dilation and curettage     Social History:  Social History    Tobacco Use      Smoking status: Never      Smokeless tobacco: Never    Alcohol use: Never    Family History   Problem Relation Age of Onset    Hypertension Mother     Dementia Mother     Hypertension Father     Breast Cancer Maternal Grandmother 79    Other (chronic kidney disease) Sister       Objective:   Neurological Examination:  /80   Pulse 70   Resp 16   Language: normal   Speech: no dysarthria  CN: II-XII intact  Motor strength: 5/5 all extremities  Tone: normal  DTRs: 2+ symmetric  Coordination: Normal FTN, no tremor noted at this time  Sensory: symmetric   Gait: nl    Test reviewed on 10/16/2023      Chucky Ruvalcaba MD  Neurology  [facility]  10/16/2023, 4:01 PM  CC: Teresita Gibbs MD

## 2023-10-16 NOTE — PROGRESS NOTES
Patient here to follow up regarding tremors. States slightly worse since last visit.  Still has difficulties with balance but not falls

## 2024-02-07 DIAGNOSIS — I10 ESSENTIAL HYPERTENSION: ICD-10-CM

## 2024-02-07 RX ORDER — VERAPAMIL HYDROCHLORIDE 120 MG/1
120 CAPSULE, EXTENDED RELEASE ORAL NIGHTLY
Qty: 90 CAPSULE | Refills: 0 | Status: SHIPPED | OUTPATIENT
Start: 2024-02-07

## 2024-02-07 RX ORDER — VERAPAMIL HYDROCHLORIDE 120 MG/1
120 CAPSULE, EXTENDED RELEASE ORAL NIGHTLY
Qty: 30 CAPSULE | Refills: 0 | Status: SHIPPED | OUTPATIENT
Start: 2024-02-07 | End: 2024-02-07

## 2024-02-07 NOTE — TELEPHONE ENCOUNTER
90 days sent.   CRITICAL LAB PAGED TO HOSPITALIST, DR. GARCIA



PAGER ID:  [phone] 

MESSAGE:  [id number]. CRITICAL LAB: [address], 3 HOUR TROP 1.61, UP FROM 0.92. ALSO 
HER HR . THANKS! Referred To Plastics For Closure Text (Leave Blank If You Do Not Want): After obtaining clear surgical margins the patient was sent to plastics for surgical repair.  The patient understands they will receive post-surgical care and follow-up from the referring physician's office.

## 2024-02-07 NOTE — TELEPHONE ENCOUNTER
30 day supply sent. Pt is due for annual.     PSR: please contact pt to schedule ANNUAL PHYSICAL not med refill f/u, prior to any additional refills.

## 2024-02-07 NOTE — TELEPHONE ENCOUNTER
Pt scheduled physical for 2/29/24. Pt states she does not want 30 day refill. She wants 90 day and will want for her appointment for refill. Has enough pills to last until appointment.

## 2024-02-29 ENCOUNTER — OFFICE VISIT (OUTPATIENT)
Dept: FAMILY MEDICINE CLINIC | Facility: CLINIC | Age: 64
End: 2024-02-29
Payer: COMMERCIAL

## 2024-02-29 VITALS
HEIGHT: 64 IN | SYSTOLIC BLOOD PRESSURE: 122 MMHG | OXYGEN SATURATION: 100 % | HEART RATE: 63 BPM | WEIGHT: 140 LBS | RESPIRATION RATE: 16 BRPM | BODY MASS INDEX: 23.9 KG/M2 | DIASTOLIC BLOOD PRESSURE: 82 MMHG

## 2024-02-29 DIAGNOSIS — Z00.00 LABORATORY EXAMINATION ORDERED AS PART OF A ROUTINE GENERAL MEDICAL EXAMINATION: ICD-10-CM

## 2024-02-29 DIAGNOSIS — Z12.31 ENCOUNTER FOR SCREENING MAMMOGRAM FOR MALIGNANT NEOPLASM OF BREAST: ICD-10-CM

## 2024-02-29 DIAGNOSIS — Z00.00 ROUTINE GENERAL MEDICAL EXAMINATION AT A HEALTH CARE FACILITY: Primary | ICD-10-CM

## 2024-02-29 PROBLEM — I10 ESSENTIAL HYPERTENSION: Status: RESOLVED | Noted: 2017-02-27 | Resolved: 2024-02-29

## 2024-02-29 PROCEDURE — 3008F BODY MASS INDEX DOCD: CPT | Performed by: FAMILY MEDICINE

## 2024-02-29 PROCEDURE — 3079F DIAST BP 80-89 MM HG: CPT | Performed by: FAMILY MEDICINE

## 2024-02-29 PROCEDURE — 3074F SYST BP LT 130 MM HG: CPT | Performed by: FAMILY MEDICINE

## 2024-02-29 PROCEDURE — 99396 PREV VISIT EST AGE 40-64: CPT | Performed by: FAMILY MEDICINE

## 2024-02-29 NOTE — PROGRESS NOTES
Elvia Raymond is a 63 year old female who presents for a complete physical exam, no gyn.  HPI:     Chief Complaint   Patient presents with    Physical       Patient feels well, dental visit up to date, no hearing problem.  Pt has h/o essential tremor, migraines: under excellent control.  Exercise: three times per week.  Diet: watches fats closely, watches sugar closely and watches calories closely    Wt Readings from Last 3 Encounters:   02/29/24 140 lb (63.5 kg)   02/24/23 140 lb (63.5 kg)   02/08/23 142 lb (64.4 kg)      BP Readings from Last 3 Encounters:   02/29/24 122/82   10/16/23 120/80   04/13/23 128/78     No LMP recorded. Patient has had a hysterectomy.         Current Outpatient Medications   Medication Sig Dispense Refill    Verapamil HCl  MG Oral Capsule SR 24 Hr Take 1 capsule (120 mg total) by mouth nightly. 90 capsule 0    verapamil  MG Oral Tab CR Take 1 tablet (120 mg total) by mouth nightly.        Past Medical History:   Diagnosis Date    Hemorrhoids     Migraine     Migraines 2000      Past Surgical History:   Procedure Laterality Date    COLONOSCOPY  2013    Dr. Cast    HYSTERECTOMY  1/1/96    TONEY BIOPSY STEREO NODULE 1 SITE LEFT (CPT=19081)  2014    benign    TONEY BIOPSY STEREO NODULE 2 SITE BILAT (CPT=19081/61015) Left 2015    Benign    OTHER  1/1/89    dilation and curettage      Family History   Problem Relation Age of Onset    Hypertension Mother     Dementia Mother         Parkinson’s Disease    Hypertension Father     Breast Cancer Maternal Grandmother 70    Other (chronic kidney disease) Sister       Social History     Socioeconomic History    Marital status:    Tobacco Use    Smoking status: Never    Smokeless tobacco: Never   Vaping Use    Vaping Use: Never used   Substance and Sexual Activity    Alcohol use: Never    Drug use: Never   Other Topics Concern    Caffeine Concern No    Stress Concern No    Weight Concern No    Special Diet No    Exercise Yes     Seat Belt Yes        REVIEW OF SYSTEMS:   GENERAL HEALTH: feels well, no fatigue.  SKIN: denies any unusual skin lesions or rashes  EYES: no visual complaints or deficits  HEENT: denies nasal congestion, sinus pain or sore throat; hearing loss negative   RESPIRATORY: denies shortness of breath, wheezing or cough   CARDIOVASCULAR: denies chest pain, SOB, edema,orthopnea, no palpitations   GI: denies nausea, vomiting, constipation, diarrhea; no rectal bleeding; no heartburn  GENITAL/: no dysuria, urgency or frequency  MUSCULOSKELETAL: no joint complaints upper or lower extremities  NEURO: no sensory or motor complaint  HEMATOLOGY: denies hx anemia; denies bruising or excessive bleeding  ENDOCRINE: denies excessive thirst or urination; denies unexpected wt gain or wt loss  ALLERGY/IMM.: denies food or seasonal allergies  PSYCH: no symptoms of depression or anxiety      EXAM:   /82   Pulse 63   Resp 16   Ht 5' 4\" (1.626 m)   Wt 140 lb (63.5 kg)   SpO2 100%   BMI 24.03 kg/m²      General: WD/WN in no acute distress.   HEENT: PERRLA and EOMI.  OP moist no lesions.  Neck is supple, with no cervical LAD or thyroid abnormalities. No carotid bruits.    Lungs: are clear to auscultation bilaterally, with no wheeze, rhonchi, or rales.   Heart: is RRR.  S1, S2, with no murmurs,clicks, gallops  Breasts bilateral: normal on inspection, normal on palpation, no lumps, no nipple abnormalities.  Abdomen: is soft,NBS, NT/ND with no HSM.  No rebound or guarding. No CVA tenderness, no hernias.  Neuro: Cranial nerves II-XII normal,no focal abnormalities, and reflexes coordination and gait normal and symmetric.Sensation intact.  Extremities: are symmetric with no cyanosis, clubbing, or edema.  MS: Normal muscles tones, no joints abnormalities.  SKIN: Normal color, turgor, no lesions, rashes or wounds.  PSYCH: Normal affect and mood.          ASSESSMENT AND PLAN:     Elvia Raymond is a 63 year old female who presents for  a complete physical exam.   Pt's was recommended low fat diet and aerobic exercise 30 minutes three times weekly.   Health maintenance.   1. Routine general medical examination at a health care facility (Primary)  2. Laboratory examination ordered as part of a routine general medical examination  -     CBC With Differential With Platelet; Future; Expected date: 02/29/2024  -     Comp Metabolic Panel (14); Future; Expected date: 02/29/2024  -     Lipid Panel; Future; Expected date: 02/29/2024  -     Vitamin D; Future; Expected date: 02/29/2024  -     TSH W Reflex To Free T4; Future; Expected date: 02/29/2024  3. Encounter for screening mammogram for malignant neoplasm of breast  -     [facility] PERFECTO 2D+3D SCREENING BILAT (CPT=77067/84895); Future; Expected date: 02/29/2024  Migraines: pt will stop Verapamil.   Up to date with colonoscopy.  The patient indicates understanding of these issues and agrees to the plan.  The patient is asked to return in 3 months or sooner if needed.

## 2024-04-11 ENCOUNTER — HOSPITAL ENCOUNTER (OUTPATIENT)
Dept: MAMMOGRAPHY | Age: 64
Discharge: HOME | End: 2024-04-11
Attending: FAMILY MEDICINE
Payer: COMMERCIAL

## 2024-04-11 DIAGNOSIS — Z12.31 ENCOUNTER FOR SCREENING MAMMOGRAM FOR MALIGNANT NEOPLASM OF BREAST: ICD-10-CM

## 2024-04-11 PROCEDURE — 77063 BREAST TOMOSYNTHESIS BI: CPT | Performed by: FAMILY MEDICINE

## 2024-04-11 PROCEDURE — 77067 SCR MAMMO BI INCL CAD: CPT | Performed by: FAMILY MEDICINE

## 2024-04-22 ENCOUNTER — HOSPITAL ENCOUNTER (OUTPATIENT)
Dept: MAMMOGRAPHY | Age: 64
Discharge: HOME | End: 2024-04-22
Attending: FAMILY MEDICINE
Payer: COMMERCIAL

## 2024-04-22 DIAGNOSIS — R92.2 INCONCLUSIVE MAMMOGRAM: ICD-10-CM

## 2024-04-22 PROCEDURE — 77065 DX MAMMO INCL CAD UNI: CPT | Performed by: FAMILY MEDICINE

## 2024-04-22 PROCEDURE — 77061 BREAST TOMOSYNTHESIS UNI: CPT | Performed by: FAMILY MEDICINE

## 2024-06-17 ENCOUNTER — OFFICE VISIT (OUTPATIENT)
Dept: NEUROLOGY | Facility: CLINIC | Age: 64
End: 2024-06-17
Payer: COMMERCIAL

## 2024-06-17 VITALS
WEIGHT: 133 LBS | BODY MASS INDEX: 23 KG/M2 | DIASTOLIC BLOOD PRESSURE: 88 MMHG | RESPIRATION RATE: 12 BRPM | HEART RATE: 68 BPM | SYSTOLIC BLOOD PRESSURE: 139 MMHG

## 2024-06-17 DIAGNOSIS — R25.1 TREMOR: Primary | ICD-10-CM

## 2024-06-17 PROCEDURE — 99214 OFFICE O/P EST MOD 30 MIN: CPT | Performed by: OTHER

## 2024-06-17 PROCEDURE — 3075F SYST BP GE 130 - 139MM HG: CPT | Performed by: OTHER

## 2024-06-17 PROCEDURE — 3079F DIAST BP 80-89 MM HG: CPT | Performed by: OTHER

## 2024-06-17 NOTE — PROGRESS NOTES
Noticing Right hand will shake with activity. Right toes will curl for no reason. Feels she has poor balance upon waking.

## 2024-06-17 NOTE — PROGRESS NOTES
[facility] Neurology Outpatient Progress Note  Date of service: 6/17/2024    Assessment:     ICD-10-CM    1. Tremor  R25.1       Tremor  (primary encounter diagnosis); not symptomatic at this time, Essential tremor vs parkinsonian tremor  Cervical degenerative disease, multilevel, with canal stenosis   Family history of PD     Plan:  Observe tremor, consider meds only if worse  I will refer pt to see our movement disorder specialist, Dr Fermin Caceres  Pt should go ER for any new or worsening symptoms and contact office      Subjective:   History:  Patient here for a follow-up visit for tremor.   Noticing right hand will shake with activity. Right toes will curl for no reason. Feels she has poor balance upon waking. She noted more tremor when writing. But does not feel tremor is affecting her daily life/activity at this time. States she does not think she needs medication.  Per initial visit note:  Elvia Raymond is a 62 year old female with past medical history as listed below presents here for initial evaluation of tremor, leg incoordination. Patient here for evaluation of tremors in left hand on/off and interruption of movement with muscles in legs for the last 2 months. her mother had PD and dementia before passing away, father has Myasthenia gravis but doing well at age mid 80s,  passed away due to pancreatic cancer. Admits increased stress in life.      History/Other:   REVIEW OF SYSTEMS:  A 10-point system was reviewed. Pertinent positives and negatives are noted as above     No current outpatient medications on file.  Allergies:  No Known Allergies  Past Medical History:    Hemorrhoids    Migraine    Migraines     Past Surgical History:   Procedure Laterality Date    Colonoscopy  2013    Dr. Cast    Hysterectomy  1/1/96    Guido biopsy stereo nodule 1 site left (cpt=19081)  2014    benign    Guido biopsy stereo nodule 2 site bilat (cpt=19081/67007) Left 2015    Benign    Other  1/1/89    dilation and curettage      Social History:  Social History     Tobacco Use    Smoking status: Never    Smokeless tobacco: Never   Substance Use Topics    Alcohol use: Never     Family History   Problem Relation Age of Onset    Hypertension Mother     Dementia Mother         Parkinson’s Disease    Hypertension Father     Breast Cancer Maternal Grandmother 70    Other (chronic kidney disease) Sister       Objective:   Neurological Examination:  /88 (BP Location: Right arm, Patient Position: Sitting, Cuff Size: adult)   Pulse 68   Resp 12   Wt 133 lb (60.3 kg)   BMI 22.83 kg/m²   Language: normal   Speech: no dysarthria  CN: II-XII intact  Motor strength: 5/5 all extremities  Tone: normal  Coordination: Normal FTN, no tremor noted at this time  Sensory: symmetric   Gait: nl    Test reviewed on 6/17/2024      Marcellus \"Tulio\"MD Jerson  Neurology  [facility]  6/17/2024, 4:02 PM  CC: Noa Mayers MD

## 2024-06-17 NOTE — PATIENT INSTRUCTIONS
Refill policies:    Allow 2-3 business days for refills; controlled substances may take longer.  Contact your pharmacy at least 5 days prior to running out of medication and have them send an electronic request or submit request through the “request refill” option in your [employer] account.  Refills are not addressed on weekends; covering physicians do not authorize routine medications on weekends.  No narcotics or controlled substances are refilled after noon on Fridays or by on call physicians.  By law, narcotics must be electronically prescribed.  A 30 day supply with no refills is the maximum allowed.  If your prescription is due for a refill, you may be due for a follow up appointment.  To best provide you care, patients receiving routine medications need to be seen at least once a year.  Patients receiving narcotic/controlled substance medications need to be seen at least once every 3 months.  In the event that your preferred pharmacy does not have the requested medication in stock (e.g. Backordered), it is your responsibility to find another pharmacy that has the requested medication available.  We will gladly send a new prescription to that pharmacy at your request.    Scheduling Tests:    If your physician has ordered radiology tests such as MRI or CT scans, please contact Central Scheduling at [phone] right away to schedule the test.  Once scheduled, the [facility] Centralized Referral Team will work with your insurance carrier to obtain pre-certification or prior authorization.  Depending on your insurance carrier, approval may take 3-10 days.  It is highly recommended patients assure they have received an authorization before having a test performed.  If test is done without insurance authorization, patient may be responsible for the entire amount billed.      Precertification and Prior Authorizations:  If your physician has recommended that you have a procedure or additional testing performed the [facility]  Centralized Referral Team will contact your insurance carrier to obtain pre-certification or prior authorization.    You are strongly encouraged to contact your insurance carrier to verify that your procedure/test has been approved and is a COVERED benefit.  Although the [facility] Centralized Referral Team does its due diligence, the insurance carrier gives the disclaimer that \"Although the procedure is authorized, this does not guarantee payment.\"    Ultimately the patient is responsible for payment.   Thank you for your understanding in this matter.  Paperwork Completion:  If you require FMLA or disability paperwork for your recovery, please make sure to either drop it off or have it faxed to our office at [phone]. Be sure the form has your name and date of birth on it.  The form will be faxed to our Forms Department and they will complete it for you.  There is a 25$ fee for all forms that need to be filled out.  Please be aware there is a 10-14 day turnaround time.  You will need to sign a release of information (RICKY) form if your paperwork does not come with one.  You may call the Forms Department with any questions at [phone].  Their fax number is [phone].

## 2024-07-11 ENCOUNTER — PATIENT MESSAGE (OUTPATIENT)
Dept: FAMILY MEDICINE CLINIC | Facility: CLINIC | Age: 64
End: 2024-07-11

## 2024-07-11 DIAGNOSIS — R20.0 NUMBNESS: Primary | ICD-10-CM

## 2024-07-11 NOTE — TELEPHONE ENCOUNTER
From: Elvia Raymond  To: Noa Mayers  Sent: 7/11/2024 12:01 PM CDT  Subject: Numbness in right toes and a bit in my hand.    I have just starting experiencing this in the past week or two consistently and am a bit concerned. I looked at the follow up from my neck MRI from last year and it says overall normal if I am reading it correctly.     I am not sure if there is a specific doctor that I should see or if I start with you, so I will wait for your response.    Thank you.

## 2024-07-11 NOTE — TELEPHONE ENCOUNTER
Please see pt message. LOV 2/29/24, last labs 2/2023. Please advise on any orders for c/o right toe numbness & in hand for past 2 weeks.

## 2024-07-23 ENCOUNTER — LAB ENCOUNTER (OUTPATIENT)
Dept: LAB | Age: 64
End: 2024-07-23
Attending: FAMILY MEDICINE
Payer: COMMERCIAL

## 2024-07-23 DIAGNOSIS — Z00.00 LABORATORY EXAMINATION ORDERED AS PART OF A ROUTINE GENERAL MEDICAL EXAMINATION: ICD-10-CM

## 2024-07-23 DIAGNOSIS — R20.0 NUMBNESS: ICD-10-CM

## 2024-07-23 LAB
ALBUMIN SERPL-MCNC: 4.6 G/DL (ref 3.2–4.8)
ALBUMIN/GLOB SERPL: 1.6 {RATIO} (ref 1–2)
ALP LIVER SERPL-CCNC: 44 U/L
ALT SERPL-CCNC: 13 U/L
ANION GAP SERPL CALC-SCNC: 4 MMOL/L (ref 0–18)
AST SERPL-CCNC: 26 U/L (ref ?–34)
BASOPHILS # BLD AUTO: 0.11 X10(3) UL (ref 0–0.2)
BASOPHILS NFR BLD AUTO: 1.9 %
BILIRUB SERPL-MCNC: 0.7 MG/DL (ref 0.2–1.1)
BUN BLD-MCNC: 13 MG/DL (ref 9–23)
CALCIUM BLD-MCNC: 9.2 MG/DL (ref 8.7–10.4)
CHLORIDE SERPL-SCNC: 107 MMOL/L (ref 98–112)
CHOLEST SERPL-MCNC: 150 MG/DL (ref ?–200)
CO2 SERPL-SCNC: 28 MMOL/L (ref 21–32)
CREAT BLD-MCNC: 0.87 MG/DL
EGFRCR SERPLBLD CKD-EPI 2021: 75 ML/MIN/1.73M2 (ref 60–?)
EOSINOPHIL # BLD AUTO: 0.09 X10(3) UL (ref 0–0.7)
EOSINOPHIL NFR BLD AUTO: 1.6 %
ERYTHROCYTE [DISTWIDTH] IN BLOOD BY AUTOMATED COUNT: 15.8 %
EST. AVERAGE GLUCOSE BLD GHB EST-MCNC: 108 MG/DL (ref 68–126)
FASTING PATIENT LIPID ANSWER: YES
FASTING STATUS PATIENT QL REPORTED: YES
GLOBULIN PLAS-MCNC: 2.9 G/DL (ref 2.8–4.4)
GLUCOSE BLD-MCNC: 83 MG/DL (ref 70–99)
HBA1C MFR BLD: 5.4 % (ref ?–5.7)
HCT VFR BLD AUTO: 40.6 %
HDLC SERPL-MCNC: 81 MG/DL (ref 40–59)
HGB BLD-MCNC: 13.3 G/DL
IMM GRANULOCYTES # BLD AUTO: 0.01 X10(3) UL (ref 0–1)
IMM GRANULOCYTES NFR BLD: 0.2 %
LDLC SERPL CALC-MCNC: 59 MG/DL (ref ?–100)
LYMPHOCYTES # BLD AUTO: 1.41 X10(3) UL (ref 1–4)
LYMPHOCYTES NFR BLD AUTO: 24.5 %
MCH RBC QN AUTO: 29.1 PG (ref 26–34)
MCHC RBC AUTO-ENTMCNC: 32.8 G/DL (ref 31–37)
MCV RBC AUTO: 88.8 FL
MONOCYTES # BLD AUTO: 0.49 X10(3) UL (ref 0.1–1)
MONOCYTES NFR BLD AUTO: 8.5 %
NEUTROPHILS # BLD AUTO: 3.64 X10 (3) UL (ref 1.5–7.7)
NEUTROPHILS # BLD AUTO: 3.64 X10(3) UL (ref 1.5–7.7)
NEUTROPHILS NFR BLD AUTO: 63.3 %
NONHDLC SERPL-MCNC: 69 MG/DL (ref ?–130)
OSMOLALITY SERPL CALC.SUM OF ELEC: 287 MOSM/KG (ref 275–295)
PLATELET # BLD AUTO: 232 10(3)UL (ref 150–450)
POTASSIUM SERPL-SCNC: 4.3 MMOL/L (ref 3.5–5.1)
PROT SERPL-MCNC: 7.5 G/DL (ref 5.7–8.2)
RBC # BLD AUTO: 4.57 X10(6)UL
SODIUM SERPL-SCNC: 139 MMOL/L (ref 136–145)
TRIGL SERPL-MCNC: 45 MG/DL (ref 30–149)
TSI SER-ACNC: 2.87 MIU/ML (ref 0.55–4.78)
VIT B12 SERPL-MCNC: 620 PG/ML (ref 211–911)
VIT D+METAB SERPL-MCNC: 18.5 NG/ML (ref 30–100)
VIT D+METAB SERPL-MCNC: 19.9 NG/ML (ref 30–100)
VLDLC SERPL CALC-MCNC: 7 MG/DL (ref 0–30)
WBC # BLD AUTO: 5.8 X10(3) UL (ref 4–11)

## 2024-07-23 PROCEDURE — 82306 VITAMIN D 25 HYDROXY: CPT

## 2024-07-23 PROCEDURE — 85025 COMPLETE CBC W/AUTO DIFF WBC: CPT

## 2024-07-23 PROCEDURE — 80061 LIPID PANEL: CPT

## 2024-07-23 PROCEDURE — 80053 COMPREHEN METABOLIC PANEL: CPT

## 2024-07-23 PROCEDURE — 84443 ASSAY THYROID STIM HORMONE: CPT

## 2024-07-23 PROCEDURE — 83036 HEMOGLOBIN GLYCOSYLATED A1C: CPT

## 2024-07-23 PROCEDURE — 82607 VITAMIN B-12: CPT

## 2024-07-23 PROCEDURE — 36415 COLL VENOUS BLD VENIPUNCTURE: CPT

## 2024-07-24 RX ORDER — ERGOCALCIFEROL 1.25 MG/1
50000 CAPSULE ORAL WEEKLY
Qty: 4 CAPSULE | Refills: 2 | Status: SHIPPED | OUTPATIENT
Start: 2024-07-24 | End: 2024-08-23

## 2024-09-05 ENCOUNTER — TELEPHONE (OUTPATIENT)
Dept: FAMILY MEDICINE CLINIC | Facility: CLINIC | Age: 64
End: 2024-09-05

## 2024-09-05 DIAGNOSIS — R29.898 UPPER EXTREMITY WEAKNESS: ICD-10-CM

## 2024-09-05 DIAGNOSIS — R20.0 NUMBNESS: Primary | ICD-10-CM

## 2024-09-05 NOTE — TELEPHONE ENCOUNTER
MRI C-spine order was put in, [facility]. I called MRI Dept & spoke with Buffy & notified her of order change. I unable to cancel MRI of neck since appt is linked to appt. She will link pt's appt to MRI C-spine & cancel MRI neck.

## 2024-09-05 NOTE — TELEPHONE ENCOUNTER
MRI department called to clarify MRI orders    Patient scheduled for MRI saturday    Asking if MRI neck should be MRI cervical spine, the MRI neck is just a soft tissue    Please advise

## 2024-09-07 ENCOUNTER — HOSPITAL ENCOUNTER (OUTPATIENT)
Dept: MRI IMAGING | Age: 64
Discharge: HOME | End: 2024-09-07
Attending: FAMILY MEDICINE
Payer: COMMERCIAL

## 2024-09-07 DIAGNOSIS — R20.0 NUMBNESS: ICD-10-CM

## 2024-09-07 DIAGNOSIS — R29.898 UPPER EXTREMITY WEAKNESS: ICD-10-CM

## 2024-09-07 PROCEDURE — 72156 MRI NECK SPINE W/O & W/DYE: CPT | Performed by: FAMILY MEDICINE

## 2024-09-07 PROCEDURE — 70553 MRI BRAIN STEM W/O & W/DYE: CPT | Performed by: FAMILY MEDICINE

## 2024-09-07 PROCEDURE — A9575 INJ GADOTERATE MEGLUMI 0.1ML: HCPCS | Performed by: FAMILY MEDICINE

## 2024-09-07 RX ORDER — GADOTERATE MEGLUMINE 376.9 MG/ML
13 INJECTION INTRAVENOUS
Status: COMPLETED | OUTPATIENT
Start: 2024-09-07 | End: 2024-09-07

## 2024-09-07 RX ADMIN — GADOTERATE MEGLUMINE 13 ML: 376.9 INJECTION INTRAVENOUS at 09:52:00

## 2024-09-09 ENCOUNTER — TELEPHONE (OUTPATIENT)
Dept: FAMILY MEDICINE CLINIC | Facility: CLINIC | Age: 64
End: 2024-09-09

## 2024-09-09 DIAGNOSIS — M54.2 CERVICALGIA: Primary | ICD-10-CM

## 2024-09-09 DIAGNOSIS — R93.7 ABNORMAL MRI, CERVICAL SPINE: ICD-10-CM

## 2024-09-09 DIAGNOSIS — R90.82 WHITE MATTER ABNORMALITY ON MRI OF BRAIN: Primary | ICD-10-CM

## 2024-09-09 DIAGNOSIS — M54.2 NECK PAIN: Primary | ICD-10-CM

## 2024-09-09 NOTE — TELEPHONE ENCOUNTER
Patient sent [employer] message with test result and referral recommendation information. Will f/u tomorrow.    Referral order placed.

## 2024-09-09 NOTE — TELEPHONE ENCOUNTER
----- Message from Noa Mayers sent at 9/9/2024 10:57 AM CDT -----  I would like pt to see our neurologist for that super small white matter signal.  Anybody from our group.

## 2024-09-11 NOTE — TELEPHONE ENCOUNTER
F/u shows patient read her [employer] message and she states that she will make appointment.

## 2024-09-12 ENCOUNTER — OFFICE VISIT (OUTPATIENT)
Dept: NEUROLOGY | Facility: CLINIC | Age: 64
End: 2024-09-12
Payer: COMMERCIAL

## 2024-09-12 ENCOUNTER — OFFICE VISIT (OUTPATIENT)
Dept: ORTHOPEDICS CLINIC | Facility: CLINIC | Age: 64
End: 2024-09-12
Payer: COMMERCIAL

## 2024-09-12 ENCOUNTER — PATIENT MESSAGE (OUTPATIENT)
Dept: ORTHOPEDICS CLINIC | Facility: CLINIC | Age: 64
End: 2024-09-12

## 2024-09-12 ENCOUNTER — HOSPITAL ENCOUNTER (OUTPATIENT)
Dept: GENERAL RADIOLOGY | Age: 64
Discharge: HOME | End: 2024-09-12
Attending: STUDENT IN AN ORGANIZED HEALTH CARE EDUCATION/TRAINING PROGRAM
Payer: COMMERCIAL

## 2024-09-12 VITALS
SYSTOLIC BLOOD PRESSURE: 138 MMHG | BODY MASS INDEX: 24 KG/M2 | RESPIRATION RATE: 16 BRPM | WEIGHT: 137 LBS | HEART RATE: 87 BPM | DIASTOLIC BLOOD PRESSURE: 78 MMHG

## 2024-09-12 DIAGNOSIS — M54.2 NECK PAIN: ICD-10-CM

## 2024-09-12 DIAGNOSIS — R25.1 TREMOR: Primary | ICD-10-CM

## 2024-09-12 DIAGNOSIS — M50.90 CERVICAL DISC DISEASE: ICD-10-CM

## 2024-09-12 DIAGNOSIS — M54.2 NECK PAIN: Primary | ICD-10-CM

## 2024-09-12 PROCEDURE — 99205 OFFICE O/P NEW HI 60 MIN: CPT | Performed by: STUDENT IN AN ORGANIZED HEALTH CARE EDUCATION/TRAINING PROGRAM

## 2024-09-12 PROCEDURE — 3075F SYST BP GE 130 - 139MM HG: CPT | Performed by: OTHER

## 2024-09-12 PROCEDURE — 72050 X-RAY EXAM NECK SPINE 4/5VWS: CPT | Performed by: STUDENT IN AN ORGANIZED HEALTH CARE EDUCATION/TRAINING PROGRAM

## 2024-09-12 PROCEDURE — 99214 OFFICE O/P EST MOD 30 MIN: CPT | Performed by: OTHER

## 2024-09-12 PROCEDURE — 3078F DIAST BP <80 MM HG: CPT | Performed by: OTHER

## 2024-09-12 NOTE — TELEPHONE ENCOUNTER
From: Elvia Raymond  To: Tone Knowles  Sent: 9/12/2024 3:47 PM CDT  Subject: EMG Scheduled for 9-24-24    I forgot to ask you if I should keep this EMG test with my neurologist. He didn’t think it was necessary, but wanted me to ask you before I cancel the test.     Thank you.   Lance

## 2024-09-12 NOTE — TELEPHONE ENCOUNTER
Do you still want the patient to get her EMG?     Ordered: By Dr. Mayers    Please select the type of EMG to be performed. EMG - 4 extremities w/ or w/o related spinal areas (DAPHNEY)   Please select the extremities the EMG will be performed on. Right Arm    Left Arm    Right Leg    Left Leg     Future Appointments   Date Time Provider Department Center   9/24/2024 10:00 AM Marcellus Arthur MD EMG [address] [facility] [address]   10/9/2024  4:00 PM Doretha Chen MD EM[facility] [address] [facility] [address]   10/24/2024  8:00 AM Tone Knowles MD [facility] ORTHO [address] [facility]

## 2024-09-12 NOTE — H&P
[facility] - ORTHOPEDICS  [address], [address], [place] [address]  [address] [place] [address]  [phone]     NEW PATIENT VISIT - HISTORY AND PHYSICAL EXAMINATION     Name: Elvia Raymond   MRN: [id number]  Date: 09/12/24       CC: Neck pain    REFERRED BY: Noa Mayers MD    HPI:   Elvia Raymond is a very pleasant 63 year old female who presents today for evaluation of neck pain. The distribution of symptoms are: 100% neck pain and 0% arm pain. The symptoms began 6 month(s) ago without any significant injury. Since the onset, the symptoms have become slowly worse over time.  The patient reports  numbness and no weakness.  The symptom characteristics are as follows: Patient is a 63-year-old female presenting with neck pain and numbness and tingling in the fingertips as well as right foot.  Patient has muscle spasms. No specific issues with dexterity or balance.     Prior spine surgery: none.    Bowel and bladder symptoms: absent.    The patient has not had issues with balance and/or hand dexterity problems such as changes in penmanship or the use of buttons or zippers.    Treatment up to this time has included:    Evaluation: PCP  NSAIDS: have not been helpful  Narcotic use: None  Physical therapy: None  Spinal injections: None      PMH:   Past Medical History:    Hemorrhoids    Migraine    Migraines       PAST SURGICAL HX:  Past Surgical History:   Procedure Laterality Date    Colonoscopy  2013    Dr. Cast    Hysterectomy  1/1/96    Guido biopsy stereo nodule 1 site left (cpt=19081)  2014    benign    Guido biopsy stereo nodule 2 site bilat (cpt=19081/52831) Left 2015    Benign    Other  1/1/89    dilation and curettage       FAMILY HX:  Family History   Problem Relation Age of Onset    Hypertension Mother     Dementia Mother         Parkinson’s Disease    Hypertension Father     Breast Cancer Maternal Grandmother 70    Other (chronic kidney disease)  Sister        ALLERGIES:  Patient has no known allergies.    MEDICATIONS:   Current Outpatient Medications   Medication Sig Dispense Refill    ALPRAZolam (XANAX) 0.25 MG Oral Tab Take 1 tablet (0.25 mg total) by mouth 2 (two) times daily as needed. 50 tablet 3       ROS: A comprehensive 14 point review of systems was performed and was negative aside from the aforementioned per history of present illness.    SOCIAL HX:  Social History     Tobacco Use    Smoking status: Never    Smokeless tobacco: Never   Substance Use Topics    Alcohol use: Never         PE:   There were no vitals filed for this visit.  Estimated body mass index is 23.52 kg/m² as calculated from the following:    Height as of 2/29/24: 5' 4\" (1.626 m).    Weight as of an earlier encounter on 9/12/24: 137 lb (62.1 kg).    Physical Exam  Constitutional:       Appearance: Normal appearance.   HENT:      Head: Normocephalic and atraumatic.   Eyes:      Extraocular Movements: Extraocular movements intact.   Cardiovascular:      Pulses: Normal pulses. Skin warm and well perfused.  Pulmonary:      Effort: Pulmonary effort is normal. No respiratory distress.   Skin:     General: Skin is warm.   Psychiatric:         Mood and Affect: Mood normal.     Spine Exam:    Normal gait without difficulty  Able to heel, toe, tandem gait without difficulty  Level shoulders and hips in even stance    Normal C-spine ROM    No tenderness to palpation of C-spine    Spluring: negative    Cowart's: negative  IRR: negative  Sustained clonus: negative     and release: normal  Rhomberg: normal    UE Strength: 5/5 D Bi Tri WE FDS IO  UE Sensation: normal in C5-T1 distribution  UE reflexes: normal    Radiographic Examination/Diagnostics:  XR and MRI personally viewed, independently interpreted and radiology report was reviewed.  X-ray of the cervical spine demonstrates degenerative changes from C4-C7  MRI of the cervical spine demonstrates canal narrowing from C4-C6 with  moderate foraminal stenosis    IMPRESSION: Elvia Raymond is a 63 year old female with cervical stenosis     PLAN:     We reviewed the patients history, symptoms, exam findings, and imaging today.  We had a detailed discussion outlining the etiology, anatomy, pathophysiology, and natural history of cervical stenosis. The typical management of this condition may include lifestyle modification, NSAIDs, physical therapy, oral steroids, epidural injections, neuromodulatory medications, and sometimes pain medications.    - Discussed operative and non operative options  - Based on our discussion today we would like to have the patient initiate our recommendations for continued conservative therapy in the treatment of their condition noted in the assessment section.    - Referred patient to PT      FOLLOW-UP:  We will see her back in follow-up in 6 weeks, or sooner if any problems arise. Patient understands and agrees with plan.       Tone Knowles MD  Orthopedic Spine Surgeon  [facility]   [address] [place] [address], [email or web address]  [email or web address]  t: [phone]   f: [phone]        This note was dictated using Dragon software.  While it was briefly proofread prior to completion, some grammatical, spelling, and word choice errors due to dictation may still occur.

## 2024-09-12 NOTE — PROGRESS NOTES
[facility] Neurology Outpatient Progress Note  Date of service: 9/12/2024    Assessment:     ICD-10-CM    1. Tremor  R25.1       2. Cervical disc disease  M50.90         Tremor  (primary encounter diagnosis); ? parkinsonian tremor  Cervical degenerative disease, multilevel, with canal stenosis   Family history of PD     Plan:  Spine surgery to see  I reviewed MRI c spine with pt, needs close follow up  I reassured pt MRI brain non specific finding does not need action and not contributing to her symptoms.  will refer pt to see our movement disorder specialist, Dr Fermin Caceres  Pt should go ER for any new or worsening symptoms     Subjective:   History:  Patient here for a follow-up visit for tremor. Here to review imaging and care plan, she has appt to see Dr Knowles for cervical spinal stenosis.  States tremors are worse. She is interested in seeing movement disorder specialist.  Noticing right hand will shake with activity. Right toes will curl for no reason. Feels she has poor balance upon waking. She noted more tremor when writing. But does not feel tremor is affecting her daily life/activity at this time. States she does not think she needs medication.  Per initial visit note:  Elvia Raymond is a 62 year old female with past medical history as listed below presents here for initial evaluation of tremor, leg incoordination. Patient here for evaluation of tremors in left hand on/off and interruption of movement with muscles in legs for the last 2 months. her mother had PD and dementia before passing away, father has Myasthenia gravis but doing well at age mid 80s,  passed away due to pancreatic cancer. Admits increased stress in life.     History/Other:   REVIEW OF SYSTEMS:  A 10-point system was reviewed. Pertinent positives and negatives are noted as above       Current Outpatient Medications:     ALPRAZolam (XANAX) 0.25 MG Oral Tab, Take 1 tablet (0.25 mg total) by mouth 2 (two) times daily as needed., Disp: 50  tablet, Rfl: 3  Allergies:  No Known Allergies  Past Medical History:    Hemorrhoids    Migraine    Migraines     Past Surgical History:   Procedure Laterality Date    Colonoscopy  2013    Dr. Cast    Hysterectomy  1/1/96    Guido biopsy stereo nodule 1 site left (cpt=19081)  2014    benign    Guido biopsy stereo nodule 2 site bilat (cpt=19081/04015) Left 2015    Benign    Other  1/1/89    dilation and curettage     Social History:  Social History     Tobacco Use    Smoking status: Never    Smokeless tobacco: Never   Substance Use Topics    Alcohol use: Never     Family History   Problem Relation Age of Onset    Hypertension Mother     Dementia Mother         Parkinson’s Disease    Hypertension Father     Breast Cancer Maternal Grandmother 70    Other (chronic kidney disease) Sister       Objective:   Neurological Examination:  /78   Pulse 87   Resp 16   Wt 137 lb (62.1 kg)   BMI 23.52 kg/m²   Language: normal   Speech: no dysarthria  CN: II-XII intact  Motor strength: 5/5 all extremities  Tone: normal  Coordination: Normal FTN, resting tremor left UE  Sensory: symmetric   Gait: fair    Test reviewed on 9/12/2024      Marcellus Rodriguez"Tulio\"MD Jerson  Neurology  [facility]  9/12/2024, 9:15 AM  CC: Noa Mayers MD

## 2024-09-12 NOTE — PATIENT INSTRUCTIONS
Refill policies:    Allow 2-3 business days for refills; controlled substances may take longer.  Contact your pharmacy at least 5 days prior to running out of medication and have them send an electronic request or submit request through the “request refill” option in your [employer] account.  Refills are not addressed on weekends; covering physicians do not authorize routine medications on weekends.  No narcotics or controlled substances are refilled after noon on Fridays or by on call physicians.  By law, narcotics must be electronically prescribed.  A 30 day supply with no refills is the maximum allowed.  If your prescription is due for a refill, you may be due for a follow up appointment.  To best provide you care, patients receiving routine medications need to be seen at least once a year.  Patients receiving narcotic/controlled substance medications need to be seen at least once every 3 months.  In the event that your preferred pharmacy does not have the requested medication in stock (e.g. Backordered), it is your responsibility to find another pharmacy that has the requested medication available.  We will gladly send a new prescription to that pharmacy at your request.    Scheduling Tests:    If your physician has ordered radiology tests such as MRI or CT scans, please contact Central Scheduling at [phone] right away to schedule the test.  Once scheduled, the [facility] Centralized Referral Team will work with your insurance carrier to obtain pre-certification or prior authorization.  Depending on your insurance carrier, approval may take 3-10 days.  It is highly recommended patients assure they have received an authorization before having a test performed.  If test is done without insurance authorization, patient may be responsible for the entire amount billed.      Precertification and Prior Authorizations:  If your physician has recommended that you have a procedure or additional testing performed the [facility]  Centralized Referral Team will contact your insurance carrier to obtain pre-certification or prior authorization.    You are strongly encouraged to contact your insurance carrier to verify that your procedure/test has been approved and is a COVERED benefit.  Although the [facility] Centralized Referral Team does its due diligence, the insurance carrier gives the disclaimer that \"Although the procedure is authorized, this does not guarantee payment.\"    Ultimately the patient is responsible for payment.   Thank you for your understanding in this matter.  Paperwork Completion:  If you require FMLA or disability paperwork for your recovery, please make sure to either drop it off or have it faxed to our office at [phone]. Be sure the form has your name and date of birth on it.  The form will be faxed to our Forms Department and they will complete it for you.  There is a 25$ fee for all forms that need to be filled out.  Please be aware there is a 10-14 day turnaround time.  You will need to sign a release of information (RICKY) form if your paperwork does not come with one.  You may call the Forms Department with any questions at [phone].  Their fax number is [phone].

## 2024-09-16 ENCOUNTER — TELEPHONE (OUTPATIENT)
Dept: PHYSICAL THERAPY | Facility: HOSPITAL | Age: 64
End: 2024-09-16

## 2024-09-24 ENCOUNTER — TELEPHONE (OUTPATIENT)
Dept: PHYSICAL THERAPY | Facility: HOSPITAL | Age: 64
End: 2024-09-24

## 2024-09-25 ENCOUNTER — OFFICE VISIT (OUTPATIENT)
Facility: LOCATION | Age: 64
End: 2024-09-25
Attending: STUDENT IN AN ORGANIZED HEALTH CARE EDUCATION/TRAINING PROGRAM
Payer: COMMERCIAL

## 2024-09-25 DIAGNOSIS — M54.2 NECK PAIN: Primary | ICD-10-CM

## 2024-09-25 PROCEDURE — 97162 PT EVAL MOD COMPLEX 30 MIN: CPT

## 2024-09-25 PROCEDURE — 97110 THERAPEUTIC EXERCISES: CPT

## 2024-09-25 NOTE — PROGRESS NOTES
INITIAL EVALUATION:   Referring Physician: Dr. Knowles  Diagnosis: Neck pain (M54.2)     Date of Service: 9/25/2024     PATIENT SUMMARY/ASSESSMENT   Elvia Raymond is a 63 year old y/o female who presents to therapy today with complaints of neck and upper back pain. She reports initial onset of symptoms over the past couple of years and notes that it has progressively worsened. She reports she has had an x-ray and an MRI, which showed  CONCLUSION:  Multilevel cervical spondylosis most notable at the C4-5 and C5-6 level with central canal stenosis, cord compression, but no signal abnormality or syrinx.  Multilevel bilateral foraminal stenosis as described above.  No compression   fracture.  No aggressive or lytic lesion of the bones.     Pain: Present 0/10 Best 0/10 Worst 8/10   The patient reports complaints of neck and upper back pain and burning. She reports her symptoms are typically aggravated with sitting for longer periods of time. She reports her symptoms are eased some with sitting up straighter and changing positions    Elvia Raymond presents with impaired joint mobility, motor function, muscle performance, and range of motion associated with neck pain  There are 1 personal factors and co-morbidities influencing plan of care, including chronic nature of symptoms, making treatment more difficult  There are 3 or more Body Structures/Functions, Activity Limitations and Participation Restrictions, including poor posture, decreased cervical ROM, decreased thoracic spine mobility  Clinical Presentation: Evolving   Elvia describes prior level of function as independent with ADLs, self-care tasks, but with increased pain and difficulty. Pt goals include decreasing complaints of pain.  Past medical history was reviewed with Elvia and there are no significant findings  Elvia would benefit from skilled Physical Therapy to address the above impairments to decrease complaints of pain and facilitate  return to previous level of function    Precautions:  None  OBJECTIVE:   Observation/Posture: The patient sits with poor posture with a forward head and rounded shoulders. She is unable to completely correct her posture and keeps a slightly forward head position even with cueing    Gait: The patient ambulates with no significant gait deviations noted    ROM:  Flexion: WFL  Extension: Moderate to severe limitations  Sidebending: R Minimal limitations; L Minimal limitations  Rotation: R Minimal limitations; L Minimal limitations  Thoracic  Flexion: WFL  Extension: Severe limitations    Accessory motion: Hypomobility with central/unilateral PAs right greater than left throughout thoracic spine    Palpation: Mild tenderness with palpation of the rhomboids/middle traps    Strength: UE strength/myotomes WNL (5/5) bilaterally    Flexibility: Moderate limitations in bilateral pectorals    Neck Disability Index Score  Score: 20 % (9/20/2024  8:24 AM)       Today’s Treatment and Response: Reviewed plan of care and role of physical therapy. Performed seated cervical AROM, scapular retraction, seated thoracic extension and provided handout for HEP and handout for ergonomic set up for computer workstation. Encouraged patient to stand and change positions/stretch frequently throughout her work day    Charges: PT Joseph Moderate complexity x1, TherEx x 1      Total Timed Treatment: 10 min     Total Treatment Time: 40 min     PLAN OF CARE:    Goals:  (To be met in 8 visits)  Patient to demonstrate independence and compliance with comprehensive home exercise program  Patient to demonstrate improved postural awareness, requiring no cueing during treatment sessions  Patient to improve NDI score to be better than 10%  Patient to report decreased complaints of pain at its worst to be less than or equal to 3/10    Frequency / Duration: Patient will be seen for 2 x/week or a total of 8 visits over a 90 day period. Treatment will include:  Manual Therapy; Therapeutic Exercises; Neuromuscular Re-education; Therapeutic Activity; Electrical Stim; Mechanical Traction;     Education or treatment limitation: None  Rehab Potential:good    Patient/Family/Caregiver was advised of these findings, precautions, and treatment options and has agreed to actively participate in planning and for this course of care.    Thank you for your referral. Please co-sign or sign and return this letter via fax as soon as possible to [phone]. If you have any questions, please contact me at Dept: [phone]    Sincerely,  Electronically signed by therapist: Filipe Cartagena PT    Physician's certification required: Yes  I certify the need for these services furnished under this plan of treatment and while under my care.    X___________________________________________________ Date____________________    Certification From: 9/25/2024  To:12/24/2024

## 2024-10-02 ENCOUNTER — OFFICE VISIT (OUTPATIENT)
Facility: LOCATION | Age: 64
End: 2024-10-02
Attending: STUDENT IN AN ORGANIZED HEALTH CARE EDUCATION/TRAINING PROGRAM
Payer: COMMERCIAL

## 2024-10-02 PROCEDURE — 97110 THERAPEUTIC EXERCISES: CPT

## 2024-10-02 PROCEDURE — 97140 MANUAL THERAPY 1/> REGIONS: CPT

## 2024-10-02 NOTE — PROGRESS NOTES
Dx: Neck pain (M54.2)           Authorized # of Visits:  8  Fall Risk: standard         Precautions: n/a             Subjective:   The patient reports some pain/burning in her lower neck/upper back with standing for a long period of time doing more meal prep  Current Pain Ratin/10  Objective:   See flow sheet    Assessment:   The patient tolerated treatment well with no significant complaints of increased pain    Plan:   Continue PT to address soft tissue and joint restrictions and provide instruction in a progressive therapeutic exercise program with manual and verbal cueing for proper form and technique    Date: 10/2/2024  Tx#:  Date:   Tx#: 3/ Date:   Tx#: 4/ Date:   Tx#: / Date:   Tx#: 6/ Date:   Tx#: 7/ Date:   Tx#: 8/   TherEx (30 min) TherEx TherEx TherEx TherEx TherEx TherEx   UBE x 6 min         Prone on elbows chin tuck x 5 with 5 sec holds         Seated chin tuck x 10 with 5 sec holds         Seated bilateral shoulder ER red band 2 x 15         Red band shoulder extension with scap retraction 2 x 10 with 5 sec holds         Seated thoracic extension in chair x 10         Manual Therapy (10 min)         Suboccipital release         Supine passive UT/LS stretches         Central/unilateral PAs T3-T8 grade 3         Gentle cervical upglides R/L C3-C6         HEP: Seated thoracic extension, Seated bilateral shoulder ER with band, Shoulder extension with band, Chin tucks    Charges: TherEx x 2; MT x 1       Total Timed Treatment: 40 min  Total Treatment Time: 40 min

## 2024-10-04 ENCOUNTER — OFFICE VISIT (OUTPATIENT)
Facility: LOCATION | Age: 64
End: 2024-10-04
Attending: STUDENT IN AN ORGANIZED HEALTH CARE EDUCATION/TRAINING PROGRAM
Payer: COMMERCIAL

## 2024-10-04 PROCEDURE — 97110 THERAPEUTIC EXERCISES: CPT

## 2024-10-04 NOTE — PROGRESS NOTES
Dx: Neck pain (M54.2)           Authorized # of Visits:  8  Fall Risk: standard         Precautions: n/a             Subjective:   The patient reports she felt a little sore after last visit, but it was ok  Current Pain Ratin/10  Objective:   See flow sheet    Assessment:   The patient tolerated progression of strengthening exercises well without complaints of pain, cueing needed for positioning with exercises    Plan:   Continue PT to address soft tissue and joint restrictions and provide instruction in a progressive therapeutic exercise program with manual and verbal cueing for proper form and technique    Date: 10/2/2024  Tx#:  Date:  10/4/2024   Tx#: 3/8 Date:   Tx#: 4/ Date:   Tx#: / Date:   Tx#: / Date:   Tx#: 7/ Date:   Tx#: /   TherEx (30 min) TherEx (40 min) TherEx TherEx TherEx TherEx TherEx   UBE x 6 min UBE x 6 min        Prone on elbows chin tuck x 5 with 5 sec holds Seated chin tucks x 10 with 5 sec holds        Seated chin tuck x 10 with 5 sec holds Seated thoracic extension        Seated bilateral shoulder ER red band 2 x 15 Supine horizontal abd and diagonals green band 2 x 15 each        Red band shoulder extension with scap retraction 2 x 10 with 5 sec holds Seated bilateral shoulder ER green band 2 x 15        Seated thoracic extension in chair x 10 Blue theratube shoulder extension with scap retraction x 15 with 5 sec holds        Manual Therapy (10 min) Reviewed HEP        Suboccipital release -        Supine passive UT/LS stretches -        Central/unilateral PAs T3-T8 grade 3 -        Gentle cervical upglides R/L C3-C6 -        HEP: Seated thoracic extension, Seated bilateral shoulder ER with band, Shoulder extension with band, Chin tucks    Charges: TherEx x 3       Total Timed Treatment: 40 min  Total Treatment Time: 40 min

## 2024-10-07 ENCOUNTER — OFFICE VISIT (OUTPATIENT)
Facility: LOCATION | Age: 64
End: 2024-10-07
Attending: STUDENT IN AN ORGANIZED HEALTH CARE EDUCATION/TRAINING PROGRAM
Payer: COMMERCIAL

## 2024-10-07 PROCEDURE — 97110 THERAPEUTIC EXERCISES: CPT

## 2024-10-07 PROCEDURE — 97140 MANUAL THERAPY 1/> REGIONS: CPT

## 2024-10-07 NOTE — PROGRESS NOTES
Dx: Neck pain (M54.2)           Authorized # of Visits:  8  Fall Risk: standard         Precautions: n/a             Subjective:   The patient reports she did some yard work over the weekend and her neck/upper back is a little sore/tender  Current Pain Ratin/10  Objective:   See flow sheet    Assessment:   The patient tolerated treatment well with significantly more upright posture/positioning with standing and sitting noted    Plan:   Continue PT to address soft tissue and joint restrictions and provide instruction in a progressive therapeutic exercise program with manual and verbal cueing for proper form and technique    Date: 10/2/2024  Tx#:  Date:  10/4/2024   Tx#: 3/8 Date:  10/7/2024   Tx#:  Date:   Tx#: / Date:   Tx#:  Date:   Tx#:  Date:   Tx#: /   TherEx (30 min) TherEx (40 min) TherEx (30 min) TherEx TherEx TherEx TherEx   UBE x 6 min UBE x 6 min Nustep L5 x 10 min       Prone on elbows chin tuck x 5 with 5 sec holds Seated chin tucks x 10 with 5 sec holds Supine horizontal abd red band 2 x 15       Seated chin tuck x 10 with 5 sec holds Seated thoracic extension Seated bilateral shoulder ER red band 2 x 15       Seated bilateral shoulder ER red band 2 x 15 Supine horizontal abd and diagonals green band 2 x 15 each Black theratube shoulder extension 2 x 15 with 5 sec holds       Red band shoulder extension with scap retraction 2 x 10 with 5 sec holds Seated bilateral shoulder ER green band 2 x 15 Reviewed HEP       Seated thoracic extension in chair x 10 Blue theratube shoulder extension with scap retraction x 15 with 5 sec holds Manual Therapy (10 min)       Manual Therapy (10 min) Reviewed HEP Suboccipital release       Suboccipital release - Central/unilateral PAs T3-T8 grade 3       Supine passive UT/LS stretches - Supine passive UT/LS stretches       Central/unilateral PAs T3-T8 grade 3 - Cervical upglides C3-C6 R/L       Gentle cervical upglides R/L C3-C6 - -       HEP: Seated  thoracic extension, Seated bilateral shoulder ER with band, Shoulder extension with band, Chin tucks    Charges: TherEx x 2; MT x 1       Total Timed Treatment: 40 min  Total Treatment Time: 40 min

## 2024-10-09 ENCOUNTER — OFFICE VISIT (OUTPATIENT)
Facility: LOCATION | Age: 64
End: 2024-10-09
Attending: STUDENT IN AN ORGANIZED HEALTH CARE EDUCATION/TRAINING PROGRAM
Payer: COMMERCIAL

## 2024-10-09 ENCOUNTER — OFFICE VISIT (OUTPATIENT)
Dept: NEUROLOGY | Facility: CLINIC | Age: 64
End: 2024-10-09
Payer: COMMERCIAL

## 2024-10-09 VITALS
SYSTOLIC BLOOD PRESSURE: 130 MMHG | WEIGHT: 137 LBS | HEART RATE: 77 BPM | OXYGEN SATURATION: 98 % | BODY MASS INDEX: 24 KG/M2 | DIASTOLIC BLOOD PRESSURE: 78 MMHG

## 2024-10-09 DIAGNOSIS — G20.A1 PARKINSON'S DISEASE WITHOUT DYSKINESIA OR FLUCTUATING MANIFESTATIONS (HCC): Primary | ICD-10-CM

## 2024-10-09 PROCEDURE — 3078F DIAST BP <80 MM HG: CPT | Performed by: OTHER

## 2024-10-09 PROCEDURE — 97110 THERAPEUTIC EXERCISES: CPT

## 2024-10-09 PROCEDURE — 99215 OFFICE O/P EST HI 40 MIN: CPT | Performed by: OTHER

## 2024-10-09 PROCEDURE — 3075F SYST BP GE 130 - 139MM HG: CPT | Performed by: OTHER

## 2024-10-09 RX ORDER — CARBIDOPA AND LEVODOPA 25; 100 MG/1; MG/1
1.5 TABLET ORAL 3 TIMES DAILY
Qty: 405 TABLET | Refills: 3 | Status: SHIPPED | OUTPATIENT
Start: 2024-10-09 | End: 2025-10-04

## 2024-10-09 NOTE — PATIENT INSTRUCTIONS
You have Parkinson's disease    Plan:    Start Carbidopa/Levodopa 25/100 mg 1 tablet three times daily for 1 week (at breakfast, lunch, and dinner)  After one week, increase the dose to 1.5 tablets three times daily  Start the Parkinson's therapy ([facility])  Return to clinic in 3 months with DONTA Alvarez                Refill policies:    Allow 2-3 business days for refills; controlled substances may take longer.  Contact your pharmacy at least 5 days prior to running out of medication and have them send an electronic request or submit request through the “request refill” option in your [employer] account.  Refills are not addressed on weekends; covering physicians do not authorize routine medications on weekends.  No narcotics or controlled substances are refilled after noon on Fridays or by on call physicians.  By law, narcotics must be electronically prescribed.  A 30 day supply with no refills is the maximum allowed.  If your prescription is due for a refill, you may be due for a follow up appointment.  To best provide you care, patients receiving routine medications need to be seen at least once a year.  Patients receiving narcotic/controlled substance medications need to be seen at least once every 3 months.  In the event that your preferred pharmacy does not have the requested medication in stock (e.g. Backordered), it is your responsibility to find another pharmacy that has the requested medication available.  We will gladly send a new prescription to that pharmacy at your request.    Scheduling Tests:    If your physician has ordered radiology tests such as MRI or CT scans, please contact Central Scheduling at [phone] right away to schedule the test.  Once scheduled, the [facility] Centralized Referral Team will work with your insurance carrier to obtain pre-certification or prior authorization.  Depending on your insurance carrier, approval may take 3-10 days.  It is highly recommended patients assure they have  received an authorization before having a test performed.  If test is done without insurance authorization, patient may be responsible for the entire amount billed.      Precertification and Prior Authorizations:  If your physician has recommended that you have a procedure or additional testing performed the [facility] Centralized Referral Team will contact your insurance carrier to obtain pre-certification or prior authorization.    You are strongly encouraged to contact your insurance carrier to verify that your procedure/test has been approved and is a COVERED benefit.  Although the [facility] Centralized Referral Team does its due diligence, the insurance carrier gives the disclaimer that \"Although the procedure is authorized, this does not guarantee payment.\"    Ultimately the patient is responsible for payment.   Thank you for your understanding in this matter.  Paperwork Completion:  If you require FMLA or disability paperwork for your recovery, please make sure to either drop it off or have it faxed to our office at [phone]. Be sure the form has your name and date of birth on it.  The form will be faxed to our Forms Department and they will complete it for you.  There is a 25$ fee for all forms that need to be filled out.  Please be aware there is a 10-14 day turnaround time.  You will need to sign a release of information (RICKY) form if your paperwork does not come with one.  You may call the Forms Department with any questions at [phone].  Their fax number is [phone].

## 2024-10-09 NOTE — PROGRESS NOTES
Dx: Neck pain (M54.2)           Authorized # of Visits:  8  Fall Risk: standard         Precautions: n/a             Subjective:   The patient reports she's feeling a little sore in her upper back  Current Pain Ratin/10  Objective:   Observation/Posture: The patient sits with improved postural awareness and is able to achieve a significantly more upright/neutral position     Gait: The patient ambulates with no significant gait deviations noted     ROM:  Flexion: WFL  Extension: Moderate limitations  Sidebending: R Minimal limitations; L Minimal limitations  Rotation: R Minimal limitations; L Minimal limitations  Thoracic  Flexion: WFL  Extension: Moderate limitations     Accessory motion: Mild hypomobility with central/unilateral PAs right greater than left throughout thoracic spine     Palpation: Mild tenderness with palpation of the rhomboids/middle traps     Strength: UE strength/myotomes WNL (5/5) bilaterally     Flexibility: Minimal limitations in bilateral pectorals    Goals:  (To be met in 8 visits)  Patient to demonstrate independence and compliance with comprehensive home exercise program Progressing towards  Patient to demonstrate improved postural awareness, requiring no cueing during treatment sessions Progressing towards  Patient to improve NDI score to be better than 10% Progressing towards  Patient to report decreased complaints of pain at its worst to be less than or equal to 3/10 Progressing towards    Assessment:   The patient has demonstrated improvements in ROM, flexibility and demonstrates improved posture. She would benefit from continued skilled physical therapy to address remaining deficits and functional limitations    Plan:   Continue PT 1-2 x per week for 3 additional visits    Date: 10/2/2024  Tx#:  Date:  10/4/2024   Tx#: 3/8 Date:  10/7/2024   Tx#:  Date:   10/9/2024  Tx#:  Date:   Tx#: / Date:   Tx#: 7/ Date:   Tx#: 8/   TherEx (30 min) TherEx (40 min) TherEx (30 min)  TherEx (40 min) TherEx TherEx TherEx   UBE x 6 min UBE x 6 min Nustep L5 x 10 min Nustep L5 x 10 min      Prone on elbows chin tuck x 5 with 5 sec holds Seated chin tucks x 10 with 5 sec holds Supine horizontal abd red band 2 x 15 Supine thoracic extension over foam roll      Seated chin tuck x 10 with 5 sec holds Seated thoracic extension Seated bilateral shoulder ER red band 2 x 15 Black theratube shoulder extension with scap retraction 2 x 10 with 5 sec holds      Seated bilateral shoulder ER red band 2 x 15 Supine horizontal abd and diagonals green band 2 x 15 each Black theratube shoulder extension 2 x 15 with 5 sec holds Seated bilateral shoulder ER red band 2 x 15      Red band shoulder extension with scap retraction 2 x 10 with 5 sec holds Seated bilateral shoulder ER green band 2 x 15 Reviewed HEP Standing chin tucks against post x 5 with 5 sec holds      Seated thoracic extension in chair x 10 Blue theratube shoulder extension with scap retraction x 15 with 5 sec holds Manual Therapy (10 min) Supine horizontal abd blue band 2 x 15      Manual Therapy (10 min) Reviewed HEP Suboccipital release Reviewed HEP      Suboccipital release - Central/unilateral PAs T3-T8 grade 3 -      Supine passive UT/LS stretches - Supine passive UT/LS stretches -      Central/unilateral PAs T3-T8 grade 3 - Cervical upglides C3-C6 R/L -      Gentle cervical upglides R/L C3-C6 - - -      HEP: Seated thoracic extension, Seated bilateral shoulder ER with band, Shoulder extension with band, Chin tucks    Charges: TherEx x 3       Total Timed Treatment: 40 min  Total Treatment Time: 40 min

## 2024-10-10 NOTE — PROGRESS NOTES
HPI:    Patient ID: Elvia Raymond is a 63 year old female.    HPI    She was referred by Dr. Arthur for further evaluation and management of her tremors.  She told me that she started developing a tremor in her left hand approximately 6 months ago that occurs at rest and to lesser extent in action.  She continues to work and when she is anxious the tremor worsens in her left hand.  She has occasionally noticed a tremor in her jaw.  No tremors in her lower extremities. She takes Xanax especially when she is anxious and that seems to help with her tremors as it improved her anxiety.  She told me that she had slowed down with her activities overall.  She shuffles when she walks but denies any freezing of gait or falls.  Her speech has changed where she has become more hypophonic over the past few months.  No swallowing problems. Her handwriting has changed mostly tremulous but also smaller?  She told me that she has been slower and processing things, she denies hallucinations or psychosis.  Denies RBD.  No urinary incontinence.  Sense of smell is intact.  She has not tried any medication for her tremors  Her PMH is significant for migraines. 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 MRI cervical spine with stenosis and mild cord compression but no cord signal abnormality at C4-C5-C6    Her mother was diagnosed with PD at an older age. No family history of PD or tremors otherwise      HISTORY:  Past Medical History:    Hemorrhoids    Migraine    Migraines      Past Surgical History:   Procedure Laterality Date    Colonoscopy  2013    Dr. Cast    Hysterectomy  1/1/96    Guido biopsy stereo nodule 1 site left (cpt=19081)  2014    benign    Guido biopsy stereo nodule 2 site bilat (cpt=19081/74098) Left 2015    Benign    Other  1/1/89    dilation and curettage      Family History   Problem Relation Age of Onset    Hypertension Mother     Dementia  Mother         Parkinson’s Disease    Hypertension Father     Breast Cancer Maternal Grandmother 70    Other (chronic kidney disease) Sister       Social History     Socioeconomic History    Marital status:    Tobacco Use    Smoking status: Never    Smokeless tobacco: Never   Vaping Use    Vaping status: Never Used   Substance and Sexual Activity    Alcohol use: Never    Drug use: Never   Other Topics Concern    Caffeine Concern Yes     Comment: 2 cups coffee AM    Stress Concern No    Weight Concern No    Special Diet No    Exercise Yes     Comment: walking, neck PT 2x weekly    Seat Belt Yes        Review of Systems  Negative except as in HPI       Current Outpatient Medications   Medication Sig Dispense Refill    carbidopa-levodopa  MG Oral Tab Take 1.5 tablets by mouth 3 (three) times daily. 405 tablet 3    ALPRAZolam (XANAX) 0.25 MG Oral Tab Take 1 tablet (0.25 mg total) by mouth 2 (two) times daily as needed. 50 tablet 3     Allergies:Allergies[1]  PHYSICAL EXAM:   Physical Exam    General Appearance: Well nourished, well developed, no apparent distress.     HEENT: Normocephalic and atraumatic. Normal sclera.  Neck: Normal range of motion. Neck supple.  Cardiovascular: Normal rate, regular rhythm   Pulmonary/Chest: Effort normal     Mental Status Exam: Patient is awake, alert and oriented to person, place and time    Cranial Nerves:  funduscopic exam is normal  II: Visual fields: normal to confrontation test  III: Pupils: equal, round, reactive to light, NO APD  III,IV,VI: Normal EOM. Normal Saccades  V: Facial sensation: intact  VII: Facial strength: Normal, masked with decreased blinking rate  VIII: Hearing: intact  IX: Palate elevates symmetrically  XI: Shoulder shrug: symmetric  XII: Tongue protrudes in midline    Motor Exam: Normal tone. Strength is  5 out of 5 in proximal and distal muscles of upper and lower extremities  Movement Disorders exam:  MDS-UPDRS Motor Exam  Patient currently on  Levodopa: no  Time of last dose: na    Speech: 1  Facial Expression: 2  Rigidity - Neck: 2  Rigidity - RUE: 2  Rigidity - LUE: 2  Rigidity - RLE: 1  Rigidity - LLE: 1  Finger tapping - R Hand: 1  Finger tapping - L Hand: 2  Hand movements - R Hand: 1  Hand movements - L Hand: 1  Pronation/supination - RH: 1  Pronation/supination - LH: 2  Toe tapping - R foot: 0  Toe tapping - L foot: 1  Leg agility - R le  Leg agility - L le  Arising from chair: 0  Gait: 1  Freezing of gait: 0  Postural stability: 0  Posture: 0  Global Spontaneity of movement: 1  Postural tremor - RH: 0  Postural tremor - LH: 0  Kinetic tremor - RH: 0  Kinetic tremor - LH: 0  Rest tremor amplitude - RUE: 0  Rest tremor amplitude - LUE: 2  Rest tremor amplitude - RLE: 0  Rest tremor amplitude - LLE: 0  Rest tremor amplitude - Lip/jaw: 0  Constancy of rest tremor: 1  Where dyskinesias present? No  Did these movements interfere with ratings? No  Oliver and Yahr Stage: 2         DTR: 3+ and symmetric.     Sensory: Normal sensation to superficial touch in all extremities    Coordination: Normal finger-nose-finger test    Gait: Occasional shuffling. NO freezing. Postural reflexes intact, she could tandem gait for 3 steps before side stepping      TESTS/IMAGING:   MRI cervical spine          ASSESSMENT/PLAN:     Encounter Diagnosis   Name Primary?    Parkinson's disease without dyskinesia or fluctuating manifestations (HCC) Yes   Parkinson's Disease:    I told her that she meets the clinical criteria for diagnosis of Parkinson's disease.  She had rest tremor that was most obvious on gait exam in her left hand.  I could not elicit the rest tremor with distraction at rest.  She was also mildly rigid and bradykinetic worse on the left side.   I told her to start carbidopa levodopa , 1 tablet 3 times daily and she will increase the dose after 1 week to 1.5 tablets 3 times daily.  I counseled her about Parkinson's disease, etiology prognosis  progression and potential therapies.  I will refer her to PT        No orders of the defined types were placed in this encounter.      Thank you for allowing us to participate in your patient's care.      Please do not hesitate to call if you have any questions.   I will continue to follow with you and will make further recommendations based on their progress.     60 total minutes spent with patient >50% of visit was spent in counseling and coordination of care      Meds This Visit:  Requested Prescriptions     Signed Prescriptions Disp Refills    carbidopa-levodopa  MG Oral Tab 405 tablet 3     Sig: Take 1.5 tablets by mouth 3 (three) times daily.       Imaging & Referrals:  OP REFERRAL TO [facility]     ID#[id number]       [1] No Known Allergies

## 2024-10-21 ENCOUNTER — OFFICE VISIT (OUTPATIENT)
Facility: LOCATION | Age: 64
End: 2024-10-21
Attending: STUDENT IN AN ORGANIZED HEALTH CARE EDUCATION/TRAINING PROGRAM
Payer: COMMERCIAL

## 2024-10-21 PROCEDURE — 97110 THERAPEUTIC EXERCISES: CPT

## 2024-10-21 NOTE — PROGRESS NOTES
Dx: Neck pain (M54.2)           Authorized # of Visits:  8  Fall Risk: standard         Precautions: n/a             Subjective:   The patient reports she had a follow up with a new neurologist last week and was diagnosed with Parkinsons  Current Pain Ratin/10  Objective:   See flow sheet    Assessment:   The patient tolerated treatment well with improving thoracic mobility and posture with standing and sitting    Plan:   Progress mobility and strengthening exercises as tolerated    Date: 10/2/2024  Tx#:  Date:  10/4/2024   Tx#: 3/8 Date:  10/7/2024   Tx#:  Date:   10/9/2024  Tx#:  Date:   10/21/2024  Tx#:  Date:   Tx#:  Date:   Tx#: 8/   TherEx (30 min) TherEx (40 min) TherEx (30 min) TherEx (40 min) TherEx (40 min) TherEx TherEx   UBE x 6 min UBE x 6 min Nustep L5 x 10 min Nustep L5 x 10 min Nustep L5 x 10 min     Prone on elbows chin tuck x 5 with 5 sec holds Seated chin tucks x 10 with 5 sec holds Supine horizontal abd red band 2 x 15 Supine thoracic extension over foam roll Doorway pec stretch 3 x 30 sec     Seated chin tuck x 10 with 5 sec holds Seated thoracic extension Seated bilateral shoulder ER red band 2 x 15 Black theratube shoulder extension with scap retraction 2 x 10 with 5 sec holds H/L LTR     Seated bilateral shoulder ER red band 2 x 15 Supine horizontal abd and diagonals green band 2 x 15 each Black theratube shoulder extension 2 x 15 with 5 sec holds Seated bilateral shoulder ER red band 2 x 15 S/L open book x 10 with 10 sec holds     Red band shoulder extension with scap retraction 2 x 10 with 5 sec holds Seated bilateral shoulder ER green band 2 x 15 Reviewed HEP Standing chin tucks against post x 5 with 5 sec holds Supine horizontal abd blue band 2 x 15     Seated thoracic extension in chair x 10 Blue theratube shoulder extension with scap retraction x 15 with 5 sec holds Manual Therapy (10 min) Supine horizontal abd blue band 2 x 15 Seated bilateral shoulder ER blue band 2  x 15     Manual Therapy (10 min) Reviewed HEP Suboccipital release Reviewed HEP Blue theratube shoulder extension with scap retraction 2 x 15 with 5 sec holds     Suboccipital release - Central/unilateral PAs T3-T8 grade 3 - Supine thoracic extension over foam roll     Supine passive UT/LS stretches - Supine passive UT/LS stretches - Supine pec stretch on foam roll x 3 min     Central/unilateral PAs T3-T8 grade 3 - Cervical upglides C3-C6 R/L - Reviewed HEP     Gentle cervical upglides R/L C3-C6 - - - -     HEP: Seated thoracic extension, Seated bilateral shoulder ER with band, Shoulder extension with band, Chin tucks    Charges: TherEx x 3       Total Timed Treatment: 40 min  Total Treatment Time: 40 min

## 2024-10-23 ENCOUNTER — OFFICE VISIT (OUTPATIENT)
Facility: LOCATION | Age: 64
End: 2024-10-23
Attending: STUDENT IN AN ORGANIZED HEALTH CARE EDUCATION/TRAINING PROGRAM
Payer: COMMERCIAL

## 2024-10-23 PROCEDURE — 97110 THERAPEUTIC EXERCISES: CPT

## 2024-10-23 NOTE — PROGRESS NOTES
Dx: Neck pain (M54.2)           Authorized # of Visits:  8  Fall Risk: standard         Precautions: n/a             Subjective:   The patient reports she's been feeling ok lately  Current Pain Ratin/10  Objective:   See flow sheet    Assessment:   The patient tolerated strengthening and mobility exercises well without significant complaints of pain    Plan:   Progress mobility and strengthening exercises as tolerated    Date: 10/2/2024  Tx#:  Date:  10/4/2024   Tx#: 3/8 Date:  10/7/2024   Tx#:  Date:   10/9/2024  Tx#:  Date:   10/21/2024  Tx#:  Date:  10/23/2024   Tx#:  Date:   Tx#: 8/   TherEx (30 min) TherEx (40 min) TherEx (30 min) TherEx (40 min) TherEx (40 min) TherEx (40 min) TherEx   UBE x 6 min UBE x 6 min Nustep L5 x 10 min Nustep L5 x 10 min Nustep L5 x 10 min Nustep L5 x 10 min    Prone on elbows chin tuck x 5 with 5 sec holds Seated chin tucks x 10 with 5 sec holds Supine horizontal abd red band 2 x 15 Supine thoracic extension over foam roll Doorway pec stretch 3 x 30 sec H/L LTR    Seated chin tuck x 10 with 5 sec holds Seated thoracic extension Seated bilateral shoulder ER red band 2 x 15 Black theratube shoulder extension with scap retraction 2 x 10 with 5 sec holds H/L LTR Supine active HS stretch x 10 with 5 sec holds    Seated bilateral shoulder ER red band 2 x 15 Supine horizontal abd and diagonals green band 2 x 15 each Black theratube shoulder extension 2 x 15 with 5 sec holds Seated bilateral shoulder ER red band 2 x 15 S/L open book x 10 with 10 sec holds S/L open book x 10 with 10 sec holds    Red band shoulder extension with scap retraction 2 x 10 with 5 sec holds Seated bilateral shoulder ER green band 2 x 15 Reviewed HEP Standing chin tucks against post x 5 with 5 sec holds Supine horizontal abd blue band 2 x 15 Doorway pec stretch 3 x 30 sec    Seated thoracic extension in chair x 10 Blue theratube shoulder extension with scap retraction x 15 with 5 sec holds Manual  Therapy (10 min) Supine horizontal abd blue band 2 x 15 Seated bilateral shoulder ER blue band 2 x 15 Supine horizontal abd blue band 2 x 15    Manual Therapy (10 min) Reviewed HEP Suboccipital release Reviewed HEP Blue theratube shoulder extension with scap retraction 2 x 15 with 5 sec holds Seated bilateral shoulder ER blue band 2 x 15    Suboccipital release - Central/unilateral PAs T3-T8 grade 3 - Supine thoracic extension over foam roll Reviewed HEP    Supine passive UT/LS stretches - Supine passive UT/LS stretches - Supine pec stretch on foam roll x 3 min -    Central/unilateral PAs T3-T8 grade 3 - Cervical upglides C3-C6 R/L - Reviewed HEP -    Gentle cervical upglides R/L C3-C6 - - - - -    HEP: Seated thoracic extension, Seated bilateral shoulder ER with band, Shoulder extension with band, Chin tucks    Charges: TherEx x 3       Total Timed Treatment: 40 min  Total Treatment Time: 40 min

## 2024-10-28 ENCOUNTER — OFFICE VISIT (OUTPATIENT)
Facility: LOCATION | Age: 64
End: 2024-10-28
Attending: STUDENT IN AN ORGANIZED HEALTH CARE EDUCATION/TRAINING PROGRAM
Payer: COMMERCIAL

## 2024-10-28 PROCEDURE — 97110 THERAPEUTIC EXERCISES: CPT

## 2024-10-28 NOTE — PROGRESS NOTES
Dx: Neck pain (M54.2)           Authorized # of Visits:  8  Fall Risk: standard         Precautions: n/a             Subjective:   The patient reports she had her first spin class this weekend and it went well. She reports overall she's been feeling better and feels comfortable continuing on her own with a home exercise program  Current Pain Ratin/10  Objective:   Observation/Posture: The patient sits with improved postural awareness, but continues to demonstrate a slightly forward head and rounded shoulders.      Gait: The patient ambulates with no significant gait deviations noted     ROM:  Flexion: WFL  Extension: Moderate limitations  Sidebending: R Minimal limitations; L Minimal limitations  Rotation: R Minimal limitations; L Minimal limitations  Thoracic  Flexion: WFL  Extension: Moderate limitations     Strength: UE strength/myotomes WNL (5/5) bilaterally     Flexibility: Minimal limitations in bilateral pectorals     Neck Disability Index Score  Score: 20 % (2024  8:24 AM)    Post Neck Disability Index Score  Post Score: (Patient-Rptd) 6 % (10/28/2024  4:22 PM)    14 % improvement       Goals:  (To be met in 8 visits)  Patient to demonstrate independence and compliance with comprehensive home exercise program Met  Patient to demonstrate improved postural awareness, requiring no cueing during treatment sessions Progressing towards  Patient to improve NDI score to be better than 10% Progressing towards  Patient to report decreased complaints of pain at its worst to be less than or equal to 3/10 Progressing towards    Assessment:   The patient has demonstrated improvements in ROM, posture, tolerance for sitting and activity. She has been given a home exercise program and has demonstrated/verbalized a good understanding of the exercises    Plan:   The patient will be discharged from outpatient physical therapy and will continue independently with a home exercise program    Date: 10/2/2024  Tx#:   Date:  10/4/2024   Tx#: 3/8 Date:  10/7/2024   Tx#: 4/8 Date:   10/9/2024  Tx#: 5/8 Date:   10/21/2024  Tx#: 6/8 Date:  10/23/2024   Tx#: 7/8 Date:  10/28/2024   Tx#: 8/8   TherEx (30 min) TherEx (40 min) TherEx (30 min) TherEx (40 min) TherEx (40 min) TherEx (40 min) TherEx (40 min)   UBE x 6 min UBE x 6 min Nustep L5 x 10 min Nustep L5 x 10 min Nustep L5 x 10 min Nustep L5 x 10 min Nustep L5 x 10 min   Prone on elbows chin tuck x 5 with 5 sec holds Seated chin tucks x 10 with 5 sec holds Supine horizontal abd red band 2 x 15 Supine thoracic extension over foam roll Doorway pec stretch 3 x 30 sec H/L LTR S/L open book x 10 with 10 sec holds   Seated chin tuck x 10 with 5 sec holds Seated thoracic extension Seated bilateral shoulder ER red band 2 x 15 Black theratube shoulder extension with scap retraction 2 x 10 with 5 sec holds H/L LTR Supine active HS stretch x 10 with 5 sec holds Doorway pec/rhomboid stretches 3 x 30 sec each   Seated bilateral shoulder ER red band 2 x 15 Supine horizontal abd and diagonals green band 2 x 15 each Black theratube shoulder extension 2 x 15 with 5 sec holds Seated bilateral shoulder ER red band 2 x 15 S/L open book x 10 with 10 sec holds S/L open book x 10 with 10 sec holds Supine horizontal abd blue band 2 x 15   Red band shoulder extension with scap retraction 2 x 10 with 5 sec holds Seated bilateral shoulder ER green band 2 x 15 Reviewed HEP Standing chin tucks against post x 5 with 5 sec holds Supine horizontal abd blue band 2 x 15 Doorway pec stretch 3 x 30 sec Seated bilateral shoulder ER blue band 2 x 15   Seated thoracic extension in chair x 10 Blue theratube shoulder extension with scap retraction x 15 with 5 sec holds Manual Therapy (10 min) Supine horizontal abd blue band 2 x 15 Seated bilateral shoulder ER blue band 2 x 15 Supine horizontal abd blue band 2 x 15 H/L LTR   Manual Therapy (10 min) Reviewed HEP Suboccipital release Reviewed HEP Blue theratube shoulder  extension with scap retraction 2 x 15 with 5 sec holds Seated bilateral shoulder ER blue band 2 x 15 Supine active HS stretch x 10 with 5 sec holds   Suboccipital release - Central/unilateral PAs T3-T8 grade 3 - Supine thoracic extension over foam roll Reviewed HEP Reviewed HEP   Supine passive UT/LS stretches - Supine passive UT/LS stretches - Supine pec stretch on foam roll x 3 min - -   Central/unilateral PAs T3-T8 grade 3 - Cervical upglides C3-C6 R/L - Reviewed HEP - -   Gentle cervical upglides R/L C3-C6 - - - - - -   HEP: Seated thoracic extension, Seated bilateral shoulder ER with band, Shoulder extension with band, Chin tucks    Charges: TherEx x 3       Total Timed Treatment: 40 min  Total Treatment Time: 40 min

## 2024-10-29 ENCOUNTER — PATIENT MESSAGE (OUTPATIENT)
Dept: NEUROLOGY | Facility: CLINIC | Age: 64
End: 2024-10-29

## 2024-11-21 ENCOUNTER — TELEPHONE (OUTPATIENT)
Dept: NEUROLOGY | Facility: CLINIC | Age: 64
End: 2024-11-21

## 2024-11-21 ENCOUNTER — PATIENT MESSAGE (OUTPATIENT)
Dept: NEUROLOGY | Facility: CLINIC | Age: 64
End: 2024-11-21

## 2024-11-21 NOTE — TELEPHONE ENCOUNTER
Type of Leave: intermittent   Reason for Leave: Parkinson's disease   Start date of leave: 12/02/2024  End date of leave: 12/02/2025  How many flare ups per month/length?:  1-4 per month each episode lasting 1 day   How many appts per month/length?: 1-4 appt per week each appt lasting 1-4 hours   Was Fee and Turnaround info Given?: Yes      Patient called to advise she will be submitting FMLA forms and requested for our dept phone fax and email- I repeated patient request and she confirmed all information is correct

## 2024-11-21 NOTE — TELEPHONE ENCOUNTER
Dr Fermin Caceres,    Patient is requesting for intermittent FMLA due to Parkinson's disease. Patient is requesting leave to start 12/02/24 and end 12/02/25. Patient is requesting 1-4 appt per week each appt lasting 1-4 hours, 1-4 flare up per month each episode lasting 1 day. Do you support?    ThanksKeyona

## 2024-12-09 NOTE — TELEPHONE ENCOUNTER
Called patient and LMTCB for any questions- advised MD message below. Received RICKY and scanned into patient chart

## 2024-12-17 ENCOUNTER — PATIENT MESSAGE (OUTPATIENT)
Dept: FAMILY MEDICINE CLINIC | Facility: CLINIC | Age: 64
End: 2024-12-17

## 2024-12-17 NOTE — TELEPHONE ENCOUNTER
Last appointment 2/29/24.   Dr WALLACE are you agreeable to fill out FMLA for intermittent leave? Does patient need appointment?

## 2024-12-19 NOTE — TELEPHONE ENCOUNTER
I printed all [facility] paperwork and put in Dr. Mayers's bin for filling.LOV -2/29/2024.

## 2024-12-19 NOTE — TELEPHONE ENCOUNTER
Spoke with patient notified her that form will be completed through the forms dept and they should reach out to her once form is completed. Patient states understanding and did not have any further questions.

## 2024-12-20 ENCOUNTER — TELEPHONE (OUTPATIENT)
Dept: FAMILY MEDICINE CLINIC | Facility: CLINIC | Age: 64
End: 2024-12-20

## 2024-12-20 NOTE — TELEPHONE ENCOUNTER
Rec FMLA forms via [employer], have RICKY, no payment made     Fax forms to employer at [phone]    Emailed forms and sent original

## 2025-01-06 ENCOUNTER — TELEPHONE (OUTPATIENT)
Dept: FAMILY MEDICINE CLINIC | Facility: CLINIC | Age: 65
End: 2025-01-06

## 2025-01-06 NOTE — TELEPHONE ENCOUNTER
Received an Family Medical Leave Act form via email and valid Release of Information.  Contacted patient to obtain details.   Patient states she wants  to complete form not Dr.El Caceres    Type of Leave: intermittent   Reason for Leave: Parkinson's disease   Start date of leave: 12/02/2024  End date of leave: 12/02/2025  How many flare ups per month/length?:  1-4 per month each episode lasting 1 day   How many appts per month/length?: 1-4 appt per week each appt lasting 1-4 hours   Was Fee and Turnaround info Given?: Yes

## 2025-01-06 NOTE — TELEPHONE ENCOUNTER
London Mayers,     Patient is requesting for intermittent FMLA due to Parkinson's disease. Patient is requesting for the leave to start 12/02/24 and end 12/02/25. Patient is requesting 1-4 appt per week each appt lasting 1-4 hours, 1-4 flare up per month each episode lasting 1 day. Do you support?     Thank you for your time,  Krys

## 2025-01-06 NOTE — TELEPHONE ENCOUNTER
Called patient to notify of the below response from the PCP.  Advised patient once Dr.El Caceres sees patient in the office to give us a call. Patient understood and had no further questions. Form moved to hold folder.

## 2025-01-15 ENCOUNTER — OFFICE VISIT (OUTPATIENT)
Dept: NEUROLOGY | Facility: CLINIC | Age: 65
End: 2025-01-15
Payer: COMMERCIAL

## 2025-01-15 VITALS
WEIGHT: 136 LBS | BODY MASS INDEX: 23 KG/M2 | DIASTOLIC BLOOD PRESSURE: 68 MMHG | HEART RATE: 70 BPM | SYSTOLIC BLOOD PRESSURE: 130 MMHG

## 2025-01-15 DIAGNOSIS — G20.A1 PARKINSON'S DISEASE WITHOUT DYSKINESIA OR FLUCTUATING MANIFESTATIONS (HCC): Primary | ICD-10-CM

## 2025-01-15 PROCEDURE — 3075F SYST BP GE 130 - 139MM HG: CPT | Performed by: OTHER

## 2025-01-15 PROCEDURE — 99214 OFFICE O/P EST MOD 30 MIN: CPT | Performed by: OTHER

## 2025-01-15 PROCEDURE — 3078F DIAST BP <80 MM HG: CPT | Performed by: OTHER

## 2025-01-15 NOTE — PATIENT INSTRUCTIONS
Refill policies:    Allow 2-3 business days for refills; controlled substances may take longer.  Contact your pharmacy at least 5 days prior to running out of medication and have them send an electronic request or submit request through the “request refill” option in your [employer] account.  Refills are not addressed on weekends; covering physicians do not authorize routine medications on weekends.  No narcotics or controlled substances are refilled after noon on Fridays or by on call physicians.  By law, narcotics must be electronically prescribed.  A 30 day supply with no refills is the maximum allowed.  If your prescription is due for a refill, you may be due for a follow up appointment.  To best provide you care, patients receiving routine medications need to be seen at least once a year.  Patients receiving narcotic/controlled substance medications need to be seen at least once every 3 months.  In the event that your preferred pharmacy does not have the requested medication in stock (e.g. Backordered), it is your responsibility to find another pharmacy that has the requested medication available.  We will gladly send a new prescription to that pharmacy at your request.    Scheduling Tests:    If your physician has ordered radiology tests such as MRI or CT scans, please contact Central Scheduling at [phone] right away to schedule the test.  Once scheduled, the [facility] Centralized Referral Team will work with your insurance carrier to obtain pre-certification or prior authorization.  Depending on your insurance carrier, approval may take 3-10 days.  It is highly recommended patients assure they have received an authorization before having a test performed.  If test is done without insurance authorization, patient may be responsible for the entire amount billed.      Precertification and Prior Authorizations:  If your physician has recommended that you have a procedure or additional testing performed the  [facility] Centralized Referral Team will contact your insurance carrier to obtain pre-certification or prior authorization.    You are strongly encouraged to contact your insurance carrier to verify that your procedure/test has been approved and is a COVERED benefit.  Although the [facility] Centralized Referral Team does its due diligence, the insurance carrier gives the disclaimer that \"Although the procedure is authorized, this does not guarantee payment.\"    Ultimately the patient is responsible for payment.   Thank you for your understanding in this matter.  Paperwork Completion:  If you require FMLA or disability paperwork for your recovery, please make sure to either drop it off or have it faxed to our office at [phone]. Be sure the form has your name and date of birth on it.  The form will be faxed to our Forms Department and they will complete it for you.  There is a 25$ fee for all forms that need to be filled out.  Please be aware there is a 10-14 day turnaround time.  You will need to sign a release of information (RICKY) form if your paperwork does not come with one.  You may call the Forms Department with any questions at [phone].  Their fax number is [phone].

## 2025-01-15 NOTE — PROGRESS NOTES
Patient reports always clearing throat, phlegmn.  Denies issues with swallowing. Reports muscles are wobbly.

## 2025-01-16 NOTE — PROGRESS NOTES
HPI:    Patient ID: Elvia Raymond is a 64 year old female.    HPI  She returns to see me today for routine follow-up of her Parkinson's disease.  I last saw her on 10/9/2024, she was referred to me by Dr. Arthur for tremors, I diagnosed her with Parkinson's disease and started her on carbidopa levodopa.  Currently she is maintained on carbidopa levodopa immediate release, , that she takes as 1 5 caplets 3 times daily.  She reported that her tremors improved since she started taking the medication, she continues to shuffle intermittently but her gait overall is better.  She reports no falls.  Her main problem is related to frequent clearing of her throat which she told me has been new over the past few months, carbidopa levodopa did not help with that.  Denied swallowing difficulty.  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  That has not changed between February 2023 and September 2024.  Mild stenosis of her cervical spine on MRI.  She said that she signed up for LSVT and she is scheduled to get it done next month.  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  She did submit a request for nursing home next year.  She did submit [facility] paperwork to attend therapy sessions starting next month.   HISTORY:  Past Medical History:    Hemorrhoids    Migraine    Migraines      Past Surgical History:   Procedure Laterality Date    Colonoscopy  2013    Dr. Cast    Hysterectomy  1/1/96    Guido biopsy stereo nodule 1 site left (cpt=19081)  2014    benign    Guido biopsy stereo nodule 2 site bilat (cpt=19081/43612) Left 2015    Benign    Other  1/1/89    dilation and curettage      Family History   Problem Relation Age of Onset    Hypertension Mother     Dementia Mother         Parkinson’s Disease    Hypertension Father     Breast Cancer Maternal Grandmother 70    Other  (chronic kidney disease) Sister       Social History     Socioeconomic History    Marital status:    Tobacco Use    Smoking status: Never    Smokeless tobacco: Never   Vaping Use    Vaping status: Never Used   Substance and Sexual Activity    Alcohol use: Never    Drug use: Never   Other Topics Concern    Caffeine Concern Yes     Comment: 2 cups coffee AM    Stress Concern No    Weight Concern No    Special Diet No    Exercise Yes     Comment: walking, neck PT 2x weekly, cycling    Seat Belt Yes        Review of Systems  Negative except as in HPI       Current Outpatient Medications   Medication Sig Dispense Refill    carbidopa-levodopa  MG Oral Tab Take 1.5 tablets by mouth 3 (three) times daily. 405 tablet 3    ALPRAZolam (XANAX) 0.25 MG Oral Tab Take 1 tablet (0.25 mg total) by mouth 2 (two) times daily as needed. 50 tablet 3     Allergies:Allergies[1]  PHYSICAL EXAM:   Physical Exam  Alert attentive and fully oriented, speech is minimally hypophonic but easy to understand, face is masked grade 2.  She has decreased blinking rate.  Pupils equal and reactive to light extraocular movements intact and visual field is full to confrontation test.  Tongue protrudes slowly in the midline.  She had an intermittent rest tremor that was obvious in her left hand, grade 1.  She had bradykinesia grade 2 in both upper extremities and lower extremities.  She was able to stand up and walk unassisted, postural reflexes are intact.  She could tandem gait for 3 steps before sidestepping.      TESTS/IMAGING:         ASSESSMENT/PLAN:     Encounter Diagnosis   Name Primary?    Parkinson's disease without dyskinesia or fluctuating manifestations (HCC) Yes       No orders of the defined types were placed in this encounter.      Parkinson's disease:    She is maintained on carbidopa levodopa immediate release , that she takes as 1.5 tablets 3 times daily.  She reports improvement in her tremors as well as her gait.   She continues to have frequent clearing of her throat, no drooling.  No swallowing difficulty.  She is scheduled to get LSVT big next month, I placed an order for LSVT loud therapy too.  I filled her FMLA paperwork.  I made no changes on her medications today    Meds This Visit:  Requested Prescriptions      No prescriptions requested or ordered in this encounter       Imaging & Referrals:  OP REFERRAL TO [facility]     ID#[id number]         [1] No Known Allergies

## 2025-02-04 ENCOUNTER — TELEPHONE (OUTPATIENT)
Dept: FAMILY MEDICINE CLINIC | Facility: CLINIC | Age: 65
End: 2025-02-04

## 2025-02-04 ENCOUNTER — APPOINTMENT (OUTPATIENT)
Dept: PHYSICAL THERAPY | Facility: HOSPITAL | Age: 65
End: 2025-02-04
Payer: COMMERCIAL

## 2025-02-04 ENCOUNTER — TELEPHONE (OUTPATIENT)
Dept: PHYSICAL THERAPY | Facility: HOSPITAL | Age: 65
End: 2025-02-04

## 2025-02-04 NOTE — TELEPHONE ENCOUNTER
Patient called asking to let Dr. Mayers know that she was tested this morning and she is positive for Influenza A.    Patient is asking for the medication without seeing the doctor.

## 2025-02-05 ENCOUNTER — OFFICE VISIT (OUTPATIENT)
Dept: FAMILY MEDICINE CLINIC | Facility: CLINIC | Age: 65
End: 2025-02-05
Payer: COMMERCIAL

## 2025-02-05 VITALS
DIASTOLIC BLOOD PRESSURE: 72 MMHG | RESPIRATION RATE: 16 BRPM | SYSTOLIC BLOOD PRESSURE: 132 MMHG | HEART RATE: 87 BPM | OXYGEN SATURATION: 96 % | BODY MASS INDEX: 23 KG/M2 | TEMPERATURE: 100 F | WEIGHT: 136 LBS

## 2025-02-05 DIAGNOSIS — J10.1 INFLUENZA A: Primary | ICD-10-CM

## 2025-02-05 DIAGNOSIS — J02.9 SORE THROAT: ICD-10-CM

## 2025-02-05 LAB
CONTROL LINE PRESENT WITH A CLEAR BACKGROUND (YES/NO): YES YES/NO
KIT LOT #: NORMAL NUMERIC
STREP GRP A CUL-SCR: NEGATIVE

## 2025-02-05 PROCEDURE — 3078F DIAST BP <80 MM HG: CPT | Performed by: NURSE PRACTITIONER

## 2025-02-05 PROCEDURE — 3075F SYST BP GE 130 - 139MM HG: CPT | Performed by: NURSE PRACTITIONER

## 2025-02-05 PROCEDURE — 87880 STREP A ASSAY W/OPTIC: CPT | Performed by: NURSE PRACTITIONER

## 2025-02-05 PROCEDURE — 99213 OFFICE O/P EST LOW 20 MIN: CPT | Performed by: NURSE PRACTITIONER

## 2025-02-05 RX ORDER — OSELTAMIVIR PHOSPHATE 75 MG/1
75 CAPSULE ORAL 2 TIMES DAILY
Qty: 10 CAPSULE | Refills: 0 | Status: SHIPPED | OUTPATIENT
Start: 2025-02-05 | End: 2025-02-10

## 2025-02-05 NOTE — PROGRESS NOTES
Chief Complaint   Patient presents with    Sore Throat     S/s for 2 days.  Home Influenza test Positive.  OTC meds taken   :    HPI:   Elvia Raymond is a 64 year old female who presents for upper respiratory symptoms for 2 days. Started suddenly.  Symptoms have been worsening since onset.  Feeling feverish, chills, headache, congestion, dry cough, malaise, body aches, weakness, rhinorrhea, + sore throat. Tolerating po.  Denies rash, N/V/D.  + home influenza A test.    Did not receive flu vaccine this season.  unknown influenza exposure, but was traveling.   Treatment tried: OTC  Travel outside of [place] in past 3 weeks: yes  Known exposure to person with COVID-19: no      Current Outpatient Medications   Medication Sig Dispense Refill    oseltamivir 75 MG Oral Cap Take 1 capsule (75 mg total) by mouth 2 (two) times daily for 5 days. 10 capsule 0    carbidopa-levodopa  MG Oral Tab Take 1.5 tablets by mouth 3 (three) times daily. 405 tablet 3    ALPRAZolam (XANAX) 0.25 MG Oral Tab Take 1 tablet (0.25 mg total) by mouth 2 (two) times daily as needed. 50 tablet 3      Past Medical History:    Hemorrhoids    Migraine    Migraines      Past Surgical History:   Procedure Laterality Date    Colonoscopy  2013    Dr. Cast    Hysterectomy  1/1/96    Guido biopsy stereo nodule 1 site left (cpt=19081)  2014    benign    Guido biopsy stereo nodule 2 site bilat (cpt=19081/84356) Left 2015    Benign    Other  1/1/89    dilation and curettage      Family History   Problem Relation Age of Onset    Hypertension Mother     Dementia Mother         Parkinson’s Disease    Hypertension Father     Breast Cancer Maternal Grandmother 70    Other (chronic kidney disease) Sister       Social History     Socioeconomic History    Marital status:    Tobacco Use    Smoking status: Never    Smokeless tobacco: Never   Vaping Use    Vaping status: Never Used   Substance and Sexual Activity    Alcohol use: Never    Drug use: Never   Other  Topics Concern    Caffeine Concern Yes     Comment: 2 cups coffee AM    Stress Concern No    Weight Concern No    Special Diet No    Exercise Yes     Comment: walking, neck PT 2x weekly, cycling    Seat Belt Yes         REVIEW OF SYSTEMS:   GENERAL: see HPI  SKIN: no rashes  EYES:denies blurred vision or double vision  HEENT: congested; see HPI  CHEST: no chest pains, palpitations.  LUNGS: denies shortness of breath or wheezing.  CARDIOVASCULAR: denies chest pain.  GI: no abdominal pain; see HPI  URO: no decreased urination.      EXAM:   /72   Pulse 87   Temp 99.9 °F (37.7 °C) (Oral)   Resp 16   Wt 136 lb (61.7 kg)   SpO2 96%   BMI 23.34 kg/m²   GENERAL: well developed, well nourished, in no apparent distress, acutely sick.  SKIN: no rashes,no suspicious lesions, flushed.  Warm to touch.  HEAD: atraumatic, normocephalic,   tenderness on palpation of sinuses  EYES: conjunctiva clear  EARS: TM's clear gray, no bulging, no retraction,  fluid, bony landmarks visible  NOSE: nostrils patent, clear nasal mucous, nasal mucosa reddened and swollen  THROAT: oral mucosa pink, moist. Posterior pharynx is not erythematous.  No exudates.  NECK: supple, non-tender  LUNGS: clear to auscultation bilaterally, no wheezes or rhonchi. Breathing is non labored.  Dry cough.  CARDIO: RRR without murmur  GI: good BS's,no masses, HSM or tenderness  EXTREMITIES: no cyanosis, clubbing or edema  LYMPH:  no cervical lymphadenopathy.        Recent Results (from the past 24 hours)   Rapid Strep    Collection Time: 02/05/25 11:46 AM   Result Value Ref Range    Strep Grp A Screen Negative Negative    Control Line Present with a clear background (yes/no) Yes Yes/No    Kit Lot # [id number] Numeric    Kit Expiration Date 12/20/2025 Date         ASSESSMENT AND PLAN:   Elvia Raymond is a 64 year old female who presents with flu-like symptoms    ASSESSMENT:  Encounter Diagnoses   Name Primary?    Sore throat     Influenza A Yes     Recent  Results (from the past 24 hours)   Rapid Strep    Collection Time: 02/05/25 11:46 AM   Result Value Ref Range    Strep Grp A Screen Negative Negative    Control Line Present with a clear background (yes/no) Yes Yes/No    Kit Lot # [id number] Numeric    Kit Expiration Date 12/20/2025 Date       PLAN:  Natural course of influenza, possible complications, CDC recommendations, and treatment options discussed.  Patient has elected to start Tamiflu after discussion of risks and benefits. Explained may lessen severity and duration of symptoms, but will not completely remove symptoms.    Rest, increase fluids,ibuprofen/tylenol q 6 hours for fever/aches prn.   Discussed OTC options for symptom relief.    Complications of influenza discussed including secondary infections such as AOM, bronchitis, PNA, sinusitis.  To be rechecked if exhibiting any symptoms of these illnesses.   To f/u with PCP in 3-4 days if sx's persist. Seek immediate medical attention for acute or worsening symptoms.   Verbalizes understanding of these issues and agrees to the plan.    Meds & Refills for this Visit:  Requested Prescriptions     Signed Prescriptions Disp Refills    oseltamivir 75 MG Oral Cap 10 capsule 0     Sig: Take 1 capsule (75 mg total) by mouth 2 (two) times daily for 5 days.         Patient Instructions   I recommend the 4 H's for inflammation:    1. Heat (warm mist from the shower or warm liquids such as tea)  2. Honey (mixed in your tea or by the spoonful [if you are not diabetic; over the age of 1 year]--take a spoonful 3 times a day and don't eat or drink anything for 15-20 minutes)  3. Humidity--cool mist in the bedroom at night  4. Hydration --at least 8 -10 glasses a day

## 2025-02-11 ENCOUNTER — OFFICE VISIT (OUTPATIENT)
Dept: PHYSICAL THERAPY | Facility: HOSPITAL | Age: 65
End: 2025-02-11
Payer: COMMERCIAL

## 2025-02-11 DIAGNOSIS — G20.A1 PARKINSON'S DISEASE WITHOUT DYSKINESIA OR FLUCTUATING MANIFESTATIONS (HCC): Primary | ICD-10-CM

## 2025-02-11 PROCEDURE — 97110 THERAPEUTIC EXERCISES: CPT

## 2025-02-11 PROCEDURE — 97161 PT EVAL LOW COMPLEX 20 MIN: CPT

## 2025-02-11 NOTE — PROGRESS NOTES
LSVT BIG EVALUATION:     Diagnosis:   Parkinson's disease without dyskinesia or fluctuating manifestations (HCC)       Referring Provider: Kim Bowers  Date of Evaluation:    2/11/2025    Precautions:  None Next MD visit:   none scheduled  Date of Surgery: n/a     PATIENT SUMMARY   Elvia Raymond is a 64 year old female who presents to therapy today with Parkinson's disease. States that she was diagnosed with PD on 10/9/24, had been having symptoms of tremors for an extended period of time with difficulty getting a diagnosis until was referred to Dr. Fermin Caceres. Patient is aware of PD symptoms as her mother had PD and recently passed away. Notes that she is a very active person and is very motivated to remain as active as possible. Recently went on vacation where was walking 20,000+ steps/day and felt better than she does if she sits all day.       Tremor: Yes, KURT hands R>L, intermittent jaw (with drinking), often most noticeable when working on fine motor tasks (scooping teaspoon of coffee)    Handedness: R hand dominant   Falls: No, but feels balance is worse/more effected when she is less active.    HAs: None, denies  Vision changes: Denies blurry vision/double vision  Dizziness: None, denies  Hearing: No changes  N/T: intermittently R rays 3-5 if extended periods of sitting, better with movement, recently improved, not sure if related to C/L  Freezing of Gait: occurs intermittently, states can usually \"count herself out of it,\" not sure if there is any reason or pattern to why it occurs, not frequent   Pain: None, denies    Current functional limitations include mild imbalance (not constant, worse when less active), slowed down with side shuffling/cross-crossing, difficulty with running fwd/lateral with pickleball, handwriting more difficult to read, scooping coffee, intermittent FOG, difficulty getting up from couch, difficulty getting up from floor, often feels like her voice is impaired because has  to clear her throat a lot, finger can sometimes stick or shake on keys of computer, occasional sitting to put on pants.     Elvia describes prior level of function as IND in household and community level activity without physical limitations.   Home Situation (stairs, live alone, use of AD)/social hx: Patient lives at home, one son lives with her, 2 story home with a basement. She does drive without issue. 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 Plans to retire at 65. One of her sons is a  and works out with him frequently.     Medication for Parkinson's disease: Yes, C/L 3x/day, does feel there is improvement with C/L  On/Off Symptoms: No  Dyskinesias: No  Deep Brain Simulator: No; Date of surgery: N/A    Pt goals include to know exactly what will help her and what needs to be addressed over the long term.   Past medical history was reviewed with Elvia. Significant findings include:  Past Medical History:    Hemorrhoids    Migraine    Migraines         ASSESSMENT  Elvia is a 64 year old female who presents for skilled physical therapy services on 2/11/25 with primary c/o deficits related to recent PD diagnosis in October 2024. The results of the objective tests and measures show patient at increased risk for falls based on tandem stance and SLS; impaired ability to interpret vestibular cues based on romberg on compliant with EC. Also with impaired coordination of the R hand and foot, impaired proprioception of RLE. Based on ceiling effect of FGA and patient level of function, would be appropriate to assess in HiMAT. Functional deficits include but are not limited to mild imbalance (not constant, worse when less active), slowed down with side shuffling/cross-crossing, difficulty with running fwd/lateral with pickleball, handwriting more difficult to read, scooping coffee, intermittent FOG, difficulty getting up from couch,  difficulty getting up from floor, often feels like her voice is impaired because has to clear her throat a lot, finger can sometimes stick or shake on keys of computer, occasional sitting to put on pants.  Signs and symptoms are consistent with diagnosis of Parkinson's disease without dyskinesia or fluctuating manifestations. Pt and PT discussed evaluation findings, pathology, POC and HEP.  Pt voiced understanding and performs HEP correctly without reported pain. Skilled Physical Therapy is medically necessary to address amplitude-based deficits to foster use of bigger movements in everyday ADL, transfers, and gait.     OBJECTIVE:   Observation/Posture: Patient sits and stands with min fwd flexion at hips/lower lumbar; min increased upper thoracic kyphosis and fwd head; flexible to VC; patient states aware and addressing in personal exercise program. She is pleasant, oriented, compliant, and appropriate, expresses motivation to improve.   Sensation: light touch intact, reports intermittent tingling R rays 3-5.   Coordination:    Alt Hand Flip: impaired ability to perform full supination and perform at fast speed maintaining appropriate pattern   Alt Finger to Nose (own nose): WNL   Nose to Moving PT Finger: min impaired accuracy R   Alt Toe Tap: WNL   KURT Toe Tap: Slowed R    Flexibility: Not formally assessed this date, patient states has always felt less flexible with seated figure 4 on L    Tone: WNL  UE and LE AROM: Grossly WNL  LE MMT: Grossly WNL; 5/5 throughout BLEs, glute max not formally assessed   strength: R (dominant) 44#; L 46.8#    Mobility / Transfers Level of Assistance   Sit --> Supine TBA; patient denies issue   Bed Rolling TBA; patient denies issue   Supine --> Sit TBA; patient denies issue   Sit --> Stand IND   Stand --> Sit IND   Chair --> Chair IND     Postural Control:   Romberg on level EO: >30 sec; level EC: >30 sec mod sway  Romberg on compliant EO: >30 sec min sway; compliant EC: 15  sec (improving)  Tandem Stance R back: 14 sec, L back: 8 sec  SLS: R 10 sec, L 6 sec    ADL Assessment:   9 Hole Peg Test: R (dominant) 23 sec; L 18 sec     Functional Mobility:  30 sec sit<>stand: 18x Fall Risk: no  Gait: pt ambulates on level ground with min fwd bent posturing, dec UE swing KURT  Timed \"Up and Go\": 7 sec  TUG Carry (10# crate): 7 sec  TUG Cognitive (count backwards from 100 by 7s): 7 sec   Dual Task Cost: 0%  FGA Score: 29/30; patient appropriate for HiMAT: TBA in future sessions      Today’s Treatment and Response:   LSVT BIG Stimulability Test: Gait - 'BIG' Walking: immediate improvement in amplitude of step length and UE swing, improved upright posturing, some initial difficulty coordinating reciprocal swing with LE speed  LSVT BIG Maximal Daily Exercise Trial  HEP Issued and Handouts Given  Patient has referral for speech therapy, has not yet started    Pt education was provided on exam findings, treatment diagnosis, treatment plan, expectations, and prognosis. Pt was also provided recommendations for postural corrections and importance of remaining active.      Charges: PT Eval: Low Complexity, Therex x 1 Total Timed Treatment: 15 min     Total Treatment Time: 60 min     Based on clinical rationale and outcome measures, this evaluation involved Low Complexity decision making.    PLAN OF CARE:    Goals: (To be met in 16 visits)  Patient will demonstrate improved ability to interpret vestibular cues by performing tandem on airex with EC x 10 sec.   Patient will demonstrate ability to perform lateral shuffling and fwd/retro jogging without freezing or slowed speed.   Patient will demonstrate improved fine motor coordination by improving R hand score on 9 Hole Peg Test by 5 sec or better.   Patient will improve score on HiMAT MCID 3 pts or better to improve ability to perform high level activity like pickleball.   Patient will be IND and compliant in HEP to maintain progress made in PT.        Frequency / Duration: Patient will be seen for 4 x/week for 4 weeks or a total of 16 visits.  Treatment will include: Neuromuscular re-education, gait training, and therapeutic exercise of LSVT Protocol Standard Maximal Daily Exercises and Functional Task Training in combination with Pt. education for posture and body mechanics, and HEP instruction and progression    Education or treatment limitation: None  Rehab Potential:good, based on goals as listed       Patient/Family/Caregiver was advised of these findings, precautions, and treatment options and has agreed to actively participate in planning and for this course of care.    Thank you for your referral. Please co-sign or sign and return this letter via fax as soon as possible to [phone]. If you have any questions, please contact me at Dept: [phone]    Sincerely,  Electronically signed by therapist: Farzana Robledo PT, DPT  Physician's certification required: Yes  I certify the need for these services furnished under this plan of treatment and while under my care.    X___________________________________________________ Date____________________    Certification From: 2/11/2025  To:5/12/2025

## 2025-02-12 ENCOUNTER — OFFICE VISIT (OUTPATIENT)
Dept: PHYSICAL THERAPY | Facility: HOSPITAL | Age: 65
End: 2025-02-12
Payer: COMMERCIAL

## 2025-02-12 PROCEDURE — 97110 THERAPEUTIC EXERCISES: CPT

## 2025-02-12 PROCEDURE — 97112 NEUROMUSCULAR REEDUCATION: CPT

## 2025-02-12 NOTE — PROGRESS NOTES
Dx: Parkinson's         Authorized # of Visits:  [id number]         Next MD visit: none scheduled  Fall Risk: standard         Precautions: n/a             Subjective: Pt. Reports she feels so much better when she is exercising and moving. Pt. States she was unable to come up with anything at home she struggles with currently. Does have some difficulty with fine motor control including writing.     Objective: Refer to flow sheet. Emphasis on Big daily exercises. Posture education with exercises. Education on increasing amplitude of movements and voice projection with counting throughout session.   6 MWT: 571 meters      Assessment: Tolerated session well. Good response to Big exercises. Demonstration for all exercises. Cues for increased arm swing with walking.       Goals:   Goals: (To be met in 16 visits)  Patient will demonstrate improved ability to interpret vestibular cues by performing tandem on airex with EC x 10 sec.   Patient will demonstrate ability to perform lateral shuffling and fwd/retro jogging without freezing or slowed speed.   Patient will demonstrate improved fine motor coordination by improving R hand score on 9 Hole Peg Test by 5 sec or better.   Patient will improve score on HiMAT MCID 3 pts or better to improve ability to perform high level activity like pickleball.   Patient will be IND and compliant in HEP to maintain progress made in PT.     Plan: Progress Big exercises as tolerated. Assess HiMAT next session.   Program Date: 2/12/2025  Tx#: 2/16 Date:  Tx#: Date:   Tx#: Date:   Tx#: Date:   Tx#:   Sustained  Floor to Ceiling  X4 with flicks       Sustained  Side to Side x4 R/L with flicks       Repetitive Forward Step  x4 R/L       Repetitive Sideways Step x4 R/L       Repetitive Backward Step x4 R/L       Repetitive Forward Rock and Reach x10 R/L       Repetitive Sideways Rock and Reach x10 R/L       Functional #1 Sit to Stand BIG x5        Functional #2 Jogging/lateral shuffle        Functional  #3 Fine motor manipulation/writing/buttoning/opening small containers Putty manipulation x 5 minutes  Flex bar yellow (u,n, twist x 10 each)       Functional #4 Balance on 1 leg to don/doff LE        BIG Walking  6MWT - 571 meters       Hierarchy Task pickleball          Scifit 3'F3'B  Sand dune  *step up x 10  *fwd lunge x 10  *squat x 10  *lat lunge x 10  Wall wipes with SB x 10  Wall push up with SB x 10         Seated on SB   *pelvic rot cw/ccw x 10  *march x 10  *knee ext x 10  *black t-band row x 15  *black t-band alt row x 10 each  *black t-band B shoulder ext x 15  *black t-band alt shoulder ext x 10 each  Bridge c SB x 15  DKTC c SB x 15  LTR c SB x 15  Passive stretching B LE by PT (HS, quad, ITB, piriformis, adductors, gastroc)       Daily Carryover: Big arm swing with walking    Charges: NREED 2(30) TE 2(30)       Total Timed Treatment: 60 min  Total Treatment Time: 60 min

## 2025-02-13 ENCOUNTER — TELEPHONE (OUTPATIENT)
Dept: PHYSICAL THERAPY | Facility: HOSPITAL | Age: 65
End: 2025-02-13

## 2025-02-13 ENCOUNTER — OFFICE VISIT (OUTPATIENT)
Dept: PHYSICAL THERAPY | Facility: HOSPITAL | Age: 65
End: 2025-02-13
Payer: COMMERCIAL

## 2025-02-13 PROCEDURE — 97116 GAIT TRAINING THERAPY: CPT

## 2025-02-13 PROCEDURE — 97110 THERAPEUTIC EXERCISES: CPT

## 2025-02-13 PROCEDURE — 97112 NEUROMUSCULAR REEDUCATION: CPT

## 2025-02-13 NOTE — PROGRESS NOTES
Dx: Parkinson's         Authorized # of Visits:  [id number]         Next MD visit: none scheduled  Fall Risk: standard         Precautions: n/a             Subjective: Patient states first treatment day went well yesterday, she really felt that the stretching at the end was helpful and would like to continue this. Also would like some tips about self stretching at home. She has been working on the exercises at home as well, feels that she is getting the hang of them pretty well. No issues with fatigue after last session or with doing the exercises, would like to do them again as normal to make sure has the details down.    Objective:   (2/12/25):  Refer to flow sheet. Emphasis on Big daily exercises. Posture education with exercises. Education on increasing amplitude of movements and voice projection with counting throughout session.   6 MWT: 571 meters    (2/13/25):  Modelling and shaping maximal daily exercises as needed throughout; focus on amplitude and effort with all motions       Assessment: Continued with maximal daily exercises without progression this date, but with focus on amplitude, sharpness of movements, and fine tuning heel rocker, open hands, foot stomp, etc to ensure highest level of effort. Educated on options for self-stretching program, areas of body at patient request and how can perform in a variety of positions, including addressing DNFs. Patient demonstrated good understanding and has variety of equipment at home. Moderate difficulty initially with moderate complexity SLS tasks, however improved quickly within session with reps. Will continue to progress as appropriate.       Goals:   Goals: (To be met in 16 visits)  Patient will demonstrate improved ability to interpret vestibular cues by performing tandem on airex with EC x 10 sec.   Patient will demonstrate ability to perform lateral shuffling and fwd/retro jogging without freezing or slowed speed.   Patient will demonstrate improved fine motor  coordination by improving R hand score on 9 Hole Peg Test by 5 sec or better.   Patient will improve score on HiMAT MCID 3 pts or better to improve ability to perform high level activity like pickleball.   Patient will be IND and compliant in HEP to maintain progress made in PT.     Plan: Progress Big exercises as tolerated. Assess HiMAT next session.   Program Date: 2/12/2025  Tx#: 2/16 Date: 2/13/2025  Tx#: 3/16   Sustained  Floor to Ceiling  X4 with flicks X5 with flicks   Sustained  Side to Side x4 R/L with flicks X5 R/L with flicks    Repetitive Forward Step  x4 R/L X 10 R/L    Repetitive Sideways Step x4 R/L X 10 R/L    Repetitive Backward Step x4 R/L X 10 R/L   Repetitive Forward Rock and Reach x10 R/L X 10 R/L   Repetitive Sideways Rock and Reach x10 R/L X 10 R/L sustained   Functional #1 Sit to Stand BIG x5  X 10   Functional #2 Jogging/lateral shuffle  Big lateral shuffles R/L with tap to cones x 5  Big grapevines with kickouts x 3 laps    Functional #3 Fine motor manipulation/writing/buttoning/opening small containers Putty manipulation x 5 minutes  Flex bar yellow (u,n, twist x 10 each) Y flexbar N bends x 10 (slow)  Y flexbar U bands x 10 (slow)  Y flexbar flicks (controlled) x 20 R/L   Functional #4 Balance on 1 leg to don/doff LE  SLS airex runner step ups, no UEs x 10 R/L  SLS catch up to 10, multiple bouts R/L    BIG Walking  6MWT - 571 meters BIG walking modelling x 250ft  BIG walking with VC for performance, PT following x 250ft  BIG retro walking with reciprocal UEs, 30ft x 3  BIG side steps with UEs x 10 R/L    Hierarchy Task pickleball  -     Scifit 3'F3'B  Sand dune  *step up x 10  *fwd lunge x 10  *squat x 10  *lat lunge x 10  Wall wipes with SB x 10  Wall push up with SB x 10 Education on home stretching program with options given, modelling and allowing to perform as needed, UE, LE, cervical including DNF training x 15 min     Seated on SB   *pelvic rot cw/ccw x 10  *march x 10  *knee ext x  10  *black t-band row x 15  *black t-band alt row x 10 each  *black t-band B shoulder ext x 15  *black t-band alt shoulder ext x 10 each  Bridge c SB x 15  DKTC c SB x 15  LTR c SB x 15  Passive stretching B LE by PT (HS, quad, ITB, piriformis, adductors, gastroc) -   Daily Carryover: Big arm swing with walking    Charges: NR x 2 (30'), Therex x 1 (15'), Gait x 1 (15')    Total Timed Treatment: 60 min  Total Treatment Time: 60 min

## 2025-02-14 ENCOUNTER — OFFICE VISIT (OUTPATIENT)
Dept: PHYSICAL THERAPY | Facility: HOSPITAL | Age: 65
End: 2025-02-14
Payer: COMMERCIAL

## 2025-02-14 PROCEDURE — 97112 NEUROMUSCULAR REEDUCATION: CPT

## 2025-02-14 PROCEDURE — 97116 GAIT TRAINING THERAPY: CPT

## 2025-02-14 PROCEDURE — 97110 THERAPEUTIC EXERCISES: CPT

## 2025-02-14 NOTE — PROGRESS NOTES
Dx: Parkinson's         Authorized # of Visits:  [id number]         Next MD visit: none scheduled  Fall Risk: standard         Precautions: n/a             Subjective: Patient states that she put her prison in for 7/1. She thinks that this will be good for her health overall to keep her moving and as active as possible. No new issues or complaints, working on maximal daily exercises, they are going well.     Objective:   (2/12/25):  Refer to flow sheet. Emphasis on Big daily exercises. Posture education with exercises. Education on increasing amplitude of movements and voice projection with counting throughout session.   6 MWT: 571 meters    (2/13/25):  Modelling and shaping maximal daily exercises as needed throughout; focus on amplitude and effort with all motions     (2/14/25):  Progression maximal daily exercises as listed below with compliant surfaces and weights as appropriate       Assessment: Progression of maximal daily exercises throughout as listed below with compliant surfaces, SB, and/or hand weights as appropriate so that patient was still able to maintain correct form and posture of exercises. Began working on dual cog task, patient able to name foods through the alphabet with self-tracking letter/position in the alphabet without impairing gait regardless of cognitive load/searching for answer. Progression BIG walking fwd/retro with dual task multi-direct catch and side stepping with dynamic over-the-fence, under-the-fence requiring lifting BLEs to step over and squatting/bending to go under.       Goals:   Goals: (To be met in 16 visits)  Patient will demonstrate improved ability to interpret vestibular cues by performing tandem on airex with EC x 10 sec. - progress  Patient will demonstrate ability to perform lateral shuffling and fwd/retro jogging without freezing or slowed speed. - progress  Patient will demonstrate improved fine motor coordination by improving R hand score on 9 Hole Peg Test by 5 sec  or better. - progress  Patient will improve score on HiMAT MCID 3 pts or better to improve ability to perform high level activity like pickleball. - progress  Patient will be IND and compliant in HEP to maintain progress made in PT. - issued and progressed    Plan: Progress Big exercises as tolerated. Assess HiMAT next session. EC tasks.    Program Date: 2/12/2025  Tx#: 2/16 Date: 2/13/2025  Tx#: 3/16 Date: 2/14/2025  Tx#: 4/16   Sustained  Floor to Ceiling  X4 with flicks X5 with flicks Seated on SB with flicks x 5   Sustained  Side to Side x4 R/L with flicks X5 R/L with flicks  Seated on SB with flicks x 5 R/L   Repetitive Forward Step  x4 R/L X 10 R/L  Onto airex with 3# hand weights x 10 R/L    Repetitive Sideways Step x4 R/L X 10 R/L  Onto airex with 3# hand weights x 10 R/L    Repetitive Backward Step x4 R/L X 10 R/L Onto airex x 10 R/L   Repetitive Forward Rock and Reach x10 R/L X 10 R/L On 2 airex pads x 10 R/L    Repetitive Sideways Rock and Reach x10 R/L X 10 R/L sustained On 2 airex pads x 10 R/L    Functional #1 Sit to Stand BIG x5  X 10 On airex beam with 3# hand weights    Functional #2 Jogging/lateral shuffle  Big lateral shuffles R/L with tap to cones x 5  Big grapevines with kickouts x 3 laps  Weighted cable resisted fwd walking, 30# x 12  Weighted cable resisted retro walking, 30# x 12  Weighted cable resisted side step, 20# x 8 R/L   Functional #3 Fine motor manipulation/writing/buttoning/opening small containers Putty manipulation x 5 minutes  Flex bar yellow (u,n, twist x 10 each) Y flexbar N bends x 10 (slow)  Y flexbar U bands x 10 (slow)  Y flexbar flicks (controlled) x 20 R/L RD/UD hammering to spots on mat moving through arc of motion R and L ~50 dots ea  RD/UD hammering in air with big movements x 20 R/L   Functional #4 Balance on 1 leg to don/doff LE  SLS airex runner step ups, no UEs x 10 R/L  SLS catch up to 10, multiple bouts R/L  SLS bosu flat side up runner step ups, no UEs x 10  R/L  SLS on flat bosu with fwd bend and return to standing, 3 x 3 sets R/L   BIG Walking  6MWT - 571 meters BIG walking modelling x 250ft  BIG walking with VC for performance, PT following x 250ft  BIG retro walking with reciprocal UEs, 30ft x 3  BIG side steps with UEs x 10 R/L  BIG walking with dual cog task naming through the alphabet (foods)       Hierarchy Task pickleball  - BIG fwd and retro walking with multi-direct catch, 50ft ea way x 2 laps     Over the fence under the fence x 1 long lap     Scifit 3'F3'B  Sand dune  *step up x 10  *fwd lunge x 10  *squat x 10  *lat lunge x 10  Wall wipes with SB x 10  Wall push up with SB x 10 Education on home stretching program with options given, modelling and allowing to perform as needed, UE, LE, cervical including DNF training x 15 min -     Seated on SB   *pelvic rot cw/ccw x 10  *march x 10  *knee ext x 10  *black t-band row x 15  *black t-band alt row x 10 each  *black t-band B shoulder ext x 15  *black t-band alt shoulder ext x 10 each  Bridge c SB x 15  DKTC c SB x 15  LTR c SB x 15  Passive stretching B LE by PT (HS, quad, ITB, piriformis, adductors, gastroc) - -   Daily Carryover: Big arm swing with walking    Charges: NR x 2 (30'), Therex x 1 (15'), Gait x 1 (15')    Total Timed Treatment: 60 min  Total Treatment Time: 60 min

## 2025-02-18 ENCOUNTER — OFFICE VISIT (OUTPATIENT)
Dept: PHYSICAL THERAPY | Facility: HOSPITAL | Age: 65
End: 2025-02-18
Payer: COMMERCIAL

## 2025-02-18 PROCEDURE — 97110 THERAPEUTIC EXERCISES: CPT

## 2025-02-18 PROCEDURE — 97116 GAIT TRAINING THERAPY: CPT

## 2025-02-18 PROCEDURE — 97112 NEUROMUSCULAR REEDUCATION: CPT

## 2025-02-18 NOTE — PROGRESS NOTES
Dx: Parkinson's         Authorized # of Visits:  16         Next MD visit: none scheduled  Fall Risk: standard         Precautions: n/a             Subjective: Patient states that she continues to do well, she did not have any issues after Friday's treatment session. She started doing Kin Stretching with her son who is her , thinks that it will get her more in tune with her body and made her more sore than she thought it was, but thinks that it will be helpful.      Objective:   (2/12/25):  Refer to flow sheet. Emphasis on Big daily exercises. Posture education with exercises. Education on increasing amplitude of movements and voice projection with counting throughout session.   6 MWT: 571 meters    (2/13/25):  Modelling and shaping maximal daily exercises as needed throughout; focus on amplitude and effort with all motions     (2/14/25):  Progression maximal daily exercises as listed below with compliant surfaces and weights as appropriate     (2/18/25):  HiMAT: 40/54      Assessment: Assessed patient on HiMAT due to ceiling effect of FGA, was able to perform with score of 40/54, will assess in future with goal of improvement MCID 4 points or better; feel this is appropriate for patient based on her activity level and plan to continue playing pickleball, etc. Progression of hierarchy tasks with fwd jogging to targets as called at random with instruction to then perform a pickleball-related task also called at random, such as danna, backhand, etc. Able to perform with appropriate control, including with retro return, no notable sign of festination, maintained amplitude.       Goals:   Goals: (To be met in 16 visits)  Patient will demonstrate improved ability to interpret vestibular cues by performing tandem on airex with EC x 10 sec. - progress  Patient will demonstrate ability to perform lateral shuffling and fwd/retro jogging without freezing or slowed speed. - progress  Patient will demonstrate  improved fine motor coordination by improving R hand score on 9 Hole Peg Test by 5 sec or better. - progress  Patient will improve score on HiMAT MCID 3 pts or better to improve ability to perform high level activity like pickleball. - progress  Patient will be IND and compliant in HEP to maintain progress made in PT. - issued and progressed    Plan: Progress Big exercises as tolerated. EC balance tasks, SLS hip hinge tap to cone.    Program Date: 2/12/2025  Tx#: 2/16 Date: 2/13/2025  Tx#: 3/16 Date: 2/14/2025  Tx#: 4/16 Date: 2/18/2025  Tx#: 5/16   Sustained  Floor to Ceiling  X4 with flicks X5 with flicks Seated on SB with flicks x 5 Seated on SB with flicks x 5   Sustained  Side to Side x4 R/L with flicks X5 R/L with flicks  Seated on SB with flicks x 5 R/L Seated on SB with flicks x 5 R/L   Repetitive Forward Step  x4 R/L X 10 R/L  Onto airex with 3# hand weights x 10 R/L  Onto airex with 3# hand weights x 10 R/L    Repetitive Sideways Step x4 R/L X 10 R/L  Onto airex with 3# hand weights x 10 R/L  Onto airex with 3# hand weights x 10 R/L    Repetitive Backward Step x4 R/L X 10 R/L Onto airex x 10 R/L Onto airex x 10 R/L   Repetitive Forward Rock and Reach x10 R/L X 10 R/L On 2 airex pads x 10 R/L  On 2 airex pads x 10 R/L    Repetitive Sideways Rock and Reach x10 R/L X 10 R/L sustained On 2 airex pads x 10 R/L  On 2 airex pads x 10 R/L    Functional #1 Sit to Stand BIG x5  X 10 On airex beam with 3# hand weights  On airex beam with 3# hand weights    Functional #2 Jogging/lateral shuffle  Big lateral shuffles R/L with tap to cones x 5  Big grapevines with kickouts x 3 laps  Weighted cable resisted fwd walking, 30# x 12  Weighted cable resisted retro walking, 30# x 12  Weighted cable resisted side step, 20# x 8 R/L HiMAT assessment    Weighted cable resisted fwd walking into lunge on bosu, 30# x 5 R/L  Weighted cable resisted side step into lateral lunge to bosu, 20# x 8 R/L    Functional #3 Fine motor  manipulation/writing/buttoning/opening small containers Putty manipulation x 5 minutes  Flex bar yellow (u,n, twist x 10 each) Y flexbar N bends x 10 (slow)  Y flexbar U bands x 10 (slow)  Y flexbar flicks (controlled) x 20 R/L RD/UD hammering to spots on mat moving through arc of motion R and L ~50 dots ea  RD/UD hammering in air with big movements x 20 R/L -   Functional #4 Balance on 1 leg to don/doff LE  SLS airex runner step ups, no UEs x 10 R/L  SLS catch up to 10, multiple bouts R/L  SLS bosu flat side up runner step ups, no UEs x 10 R/L  SLS on flat bosu with fwd bend and return to standing, 3 x 3 sets R/L SLS bosu flat side up runner step ups with BUE OH reach, 3 sec hold x 10 R/L    BIG Walking  6MWT - 571 meters BIG walking modelling x 250ft  BIG walking with VC for performance, PT following x 250ft  BIG retro walking with reciprocal UEs, 30ft x 3  BIG side steps with UEs x 10 R/L  BIG walking with dual cog task naming through the alphabet (foods)     BIG walking with dual cog task naming through the alphabet with patient also recalling place in alphabet (animals)   Hierarchy Task pickleball  - BIG fwd and retro walking with multi-direct catch, 50ft ea way x 2 laps     Over the fence under the fence x 1 long lap BIG retro walking with multi-direct catch, 50ft ea way x 2 laps   Fwd jogging with tap to dot as called at random, retro jog return x 8; added calling out pickleball shots at random when at the dot x 8     Scifit 3'F3'B  Sand dune  *step up x 10  *fwd lunge x 10  *squat x 10  *lat lunge x 10  Wall wipes with SB x 10  Wall push up with SB x 10 Education on home stretching program with options given, modelling and allowing to perform as needed, UE, LE, cervical including DNF training x 15 min -      Seated on SB   *pelvic rot cw/ccw x 10  *march x 10  *knee ext x 10  *black t-band row x 15  *black t-band alt row x 10 each  *black t-band B shoulder ext x 15  *black t-band alt shoulder ext x 10  each  Bridge c SB x 15  DKTC c SB x 15  LTR c SB x 15  Passive stretching B LE by PT (HS, quad, ITB, piriformis, adductors, gastroc) - - Passive stretching B LE by PT (HS, quad, ITB, piriformis, adductors, gastroc)   Daily Carryover: Big arm swing with walking    Charges: NR x 2 (30'), Therex x 1 (15'), Gait x 1 (15')    Total Timed Treatment: 60 min  Total Treatment Time: 60 min

## 2025-02-19 ENCOUNTER — OFFICE VISIT (OUTPATIENT)
Dept: PHYSICAL THERAPY | Facility: HOSPITAL | Age: 65
End: 2025-02-19
Payer: COMMERCIAL

## 2025-02-19 PROCEDURE — 97116 GAIT TRAINING THERAPY: CPT

## 2025-02-19 PROCEDURE — 97112 NEUROMUSCULAR REEDUCATION: CPT

## 2025-02-19 PROCEDURE — 97110 THERAPEUTIC EXERCISES: CPT

## 2025-02-19 NOTE — PROGRESS NOTES
Dx: Parkinson's         Authorized # of Visits:  16         Next MD visit: none scheduled  Fall Risk: standard         Precautions: n/a             Subjective: No new complaints. Feels good with all her exercises and stretching.     Objective:   (2/12/25):  Refer to flow sheet. Emphasis on Big daily exercises. Posture education with exercises. Education on increasing amplitude of movements and voice projection with counting throughout session.   6 MWT: 571 meters    (2/13/25):  Modelling and shaping maximal daily exercises as needed throughout; focus on amplitude and effort with all motions     (2/14/25):  Progression maximal daily exercises as listed below with compliant surfaces and weights as appropriate     (2/18/25):  HiMAT: 40/54    (2/19/25): Refer to flow sheet. Able to perform daily exercises independently. Added sand dune this date and 2# weights.       Assessment:  Progression of hierarchy tasks with fwd/retro  and side to side jogging to targets while throwing ball at rebounder. Able to perform with appropriate control, including with retro return, no notable sign of festination, maintained amplitude. Challenged with squats on bosu with 25# on free motion.      Goals:   Goals: (To be met in 16 visits)  Patient will demonstrate improved ability to interpret vestibular cues by performing tandem on airex with EC x 10 sec. - progress  Patient will demonstrate ability to perform lateral shuffling and fwd/retro jogging without freezing or slowed speed. - progress  Patient will demonstrate improved fine motor coordination by improving R hand score on 9 Hole Peg Test by 5 sec or better. - progress  Patient will improve score on HiMAT MCID 3 pts or better to improve ability to perform high level activity like pickleball. - progress  Patient will be IND and compliant in HEP to maintain progress made in PT. - issued and progressed    Plan: Progress Big exercises as tolerated. EC balance tasks, SLS hip hinge tap to  cone.    Program Date: 2/12/2025  Tx#: 2/16 Date: 2/13/2025  Tx#: 3/16 Date: 2/14/2025  Tx#: 4/16 Date: 2/18/2025  Tx#: 5/16 Date: 2/19/25  Tx: 6/16   Sustained  Floor to Ceiling  X4 with flicks X5 with flicks Seated on SB with flicks x 5 Seated on SB with flicks x 5 Seated on SB with 2# weights x 5   Sustained  Side to Side x4 R/L with flicks X5 R/L with flicks  Seated on SB with flicks x 5 R/L Seated on SB with flicks x 5 R/L Seated  on SB c 2# weights x 5   Repetitive Forward Step  x4 R/L X 10 R/L  Onto airex with 3# hand weights x 10 R/L  Onto airex with 3# hand weights x 10 R/L  Onto sand dune with 2# weights x 10 R/L   Repetitive Sideways Step x4 R/L X 10 R/L  Onto airex with 3# hand weights x 10 R/L  Onto airex with 3# hand weights x 10 R/L  Onto sand dune with 2# weights x 10 R/L   Repetitive Backward Step x4 R/L X 10 R/L Onto airex x 10 R/L Onto airex x 10 R/L Off sand dune with 2# weights x 10 R/L   Repetitive Forward Rock and Reach x10 R/L X 10 R/L On 2 airex pads x 10 R/L  On 2 airex pads x 10 R/L  On 2 airex pads x 10 R/L   Repetitive Sideways Rock and Reach x10 R/L X 10 R/L sustained On 2 airex pads x 10 R/L  On 2 airex pads x 10 R/L  On sand dune x 10 R/L   Functional #1 Sit to Stand BIG x5  X 10 On airex beam with 3# hand weights  On airex beam with 3# hand weights  Off SB c 2# weights x 10   Functional #2 Jogging/lateral shuffle  Big lateral shuffles R/L with tap to cones x 5  Big grapevines with kickouts x 3 laps  Weighted cable resisted fwd walking, 30# x 12  Weighted cable resisted retro walking, 30# x 12  Weighted cable resisted side step, 20# x 8 R/L HiMAT assessment    Weighted cable resisted fwd walking into lunge on bosu, 30# x 5 R/L  Weighted cable resisted side step into lateral lunge to bosu, 20# x 8 R/L  Weighted cable resisted 30#  *fwd/bwd walking x 5   *Lat R/L walking x 5  *lunge onto bosu ball x 10 each  *lat lunge onto bosu ball x 10 each  *step up onto bosu ball x 10 each  *squat  on bosu ball x 10       Functional #3 Fine motor manipulation/writing/buttoning/opening small containers Putty manipulation x 5 minutes  Flex bar yellow (u,n, twist x 10 each) Y flexbar N bends x 10 (slow)  Y flexbar U bands x 10 (slow)  Y flexbar flicks (controlled) x 20 R/L RD/UD hammering to spots on mat moving through arc of motion R and L ~50 dots ea  RD/UD hammering in air with big movements x 20 R/L -    Functional #4 Balance on 1 leg to don/doff LE  SLS airex runner step ups, no UEs x 10 R/L  SLS catch up to 10, multiple bouts R/L  SLS bosu flat side up runner step ups, no UEs x 10 R/L  SLS on flat bosu with fwd bend and return to standing, 3 x 3 sets R/L SLS bosu flat side up runner step ups with BUE OH reach, 3 sec hold x 10 R/L     BIG Walking  6MWT - 571 meters BIG walking modelling x 250ft  BIG walking with VC for performance, PT following x 250ft  BIG retro walking with reciprocal UEs, 30ft x 3  BIG side steps with UEs x 10 R/L  BIG walking with dual cog task naming through the alphabet (foods)     BIG walking with dual cog task naming through the alphabet with patient also recalling place in alphabet (animals) BIG walking with dual cog task (food/places)   Hierarchy Task pickleball  - BIG fwd and retro walking with multi-direct catch, 50ft ea way x 2 laps     Over the fence under the fence x 1 long lap BIG retro walking with multi-direct catch, 50ft ea way x 2 laps   Fwd jogging with tap to dot as called at random, retro jog return x 8; added calling out pickleball shots at random when at the dot x 8 Rebounder *2# MB fwd/bwd target jogging with throwing ball  *2# MB lat side shuffle with ball throw at rebounder      Scifit 3'F3'B  Sand dune  *step up x 10  *fwd lunge x 10  *squat x 10  *lat lunge x 10  Wall wipes with SB x 10  Wall push up with SB x 10 Education on home stretching program with options given, modelling and allowing to perform as needed, UE, LE, cervical including DNF training x 15 min  -  Bridge c SB x 15  Abdominal iso with SB all 4s x 10  Abdominal iso with SB diagonal squeeze x 10  Dead bug c SB x 10  LTR c SB x 10  DKTC c SB x 10     Seated on SB   *pelvic rot cw/ccw x 10  *march x 10  *knee ext x 10  *black t-band row x 15  *black t-band alt row x 10 each  *black t-band B shoulder ext x 15  *black t-band alt shoulder ext x 10 each  Bridge c SB x 15  DKTC c SB x 15  LTR c SB x 15  Passive stretching B LE by PT (HS, quad, ITB, piriformis, adductors, gastroc) - - Passive stretching B LE by PT (HS, quad, ITB, piriformis, adductors, gastroc) Passive stretching B LE by PT (HS, quad, ITB, piriformis, adductors, gastroc)   Daily Carryover: big punching arms into coat    Charges: NR x 2 (30'), Therex x 1 (15'), Gait x 1 (15')    Total Timed Treatment: 60 min  Total Treatment Time: 60 min

## 2025-02-20 ENCOUNTER — OFFICE VISIT (OUTPATIENT)
Dept: PHYSICAL THERAPY | Facility: HOSPITAL | Age: 65
End: 2025-02-20
Payer: COMMERCIAL

## 2025-02-20 PROCEDURE — 97116 GAIT TRAINING THERAPY: CPT

## 2025-02-20 PROCEDURE — 97110 THERAPEUTIC EXERCISES: CPT

## 2025-02-20 PROCEDURE — 97112 NEUROMUSCULAR REEDUCATION: CPT

## 2025-02-20 NOTE — PROGRESS NOTES
Dx: Parkinson's         Authorized # of Visits:  [id number]         Next MD visit: none scheduled  Fall Risk: standard         Precautions: n/a             Subjective: Patient states doing well, nothing new to report this date, she feels she is doing well and enjoying the exercises.     Objective:   (2/12/25):  Refer to flow sheet. Emphasis on Big daily exercises. Posture education with exercises. Education on increasing amplitude of movements and voice projection with counting throughout session.   6 MWT: 571 meters    (2/13/25):  Modelling and shaping maximal daily exercises as needed throughout; focus on amplitude and effort with all motions     (2/14/25):  Progression maximal daily exercises as listed below with compliant surfaces and weights as appropriate     (2/18/25):  HiMAT: 40/54    (2/19/25): Refer to flow sheet. Able to perform daily exercises independently. Added sand dune this date and 2# weights.       Assessment: Continued progression of maximal daily exercises as listed below. Patient able to perform with intermittent VC and shaping for most appropriate performance. Able to progress to addressing EC tasks, mild imbalance, but improving with reps, including with small CORBY, compliant surfaces. Was able to perform fwd/retro BIG walking with EC, initial drift towards the L, but improved with reps. Progression SLS tasks with hip hinge tap to chair, improved within set to be able to perform x 5 without LOB.       Goals:   Goals: (To be met in 16 visits)  Patient will demonstrate improved ability to interpret vestibular cues by performing tandem on airex with EC x 10 sec. - progress  Patient will demonstrate ability to perform lateral shuffling and fwd/retro jogging without freezing or slowed speed. - progress  Patient will demonstrate improved fine motor coordination by improving R hand score on 9 Hole Peg Test by 5 sec or better. - progress  Patient will improve score on HiMAT MCID 3 pts or better to improve  ability to perform high level activity like pickleball. - progress  Patient will be IND and compliant in HEP to maintain progress made in PT. - issued and progressed    Plan: Progress Big exercises as tolerated. Ladder tasks.    Program Date: 2/12/2025  Tx#: 2/16 Date: 2/13/2025  Tx#: 3/16 Date: 2/14/2025  Tx#: 4/16 Date: 2/18/2025  Tx#: 5/16 Date: 2/19/25  Tx: 6/16 Date: 2/20/25  Tx: 7/16   Sustained  Floor to Ceiling  X4 with flicks X5 with flicks Seated on SB with flicks x 5 Seated on SB with flicks x 5 Seated on SB with 2# weights x 5 Seated on SB with 2# weights x 5   Sustained  Side to Side x4 R/L with flicks X5 R/L with flicks  Seated on SB with flicks x 5 R/L Seated on SB with flicks x 5 R/L Seated  on SB c 2# weights x 5 Seated  on SB with 2# weights x 5   Repetitive Forward Step  x4 R/L X 10 R/L  Onto airex with 3# hand weights x 10 R/L  Onto airex with 3# hand weights x 10 R/L  Onto sand dune with 2# weights x 10 R/L Onto sand dune with 2# weights x 10 R/L   Repetitive Sideways Step x4 R/L X 10 R/L  Onto airex with 3# hand weights x 10 R/L  Onto airex with 3# hand weights x 10 R/L  Onto sand dune with 2# weights x 10 R/L Onto sand dune with 2# weights x 10 R/L   Repetitive Backward Step x4 R/L X 10 R/L Onto airex x 10 R/L Onto airex x 10 R/L Off sand dune with 2# weights x 10 R/L Off sand dune with 2# weights x 10 R/L   Repetitive Forward Rock and Reach x10 R/L X 10 R/L On 2 airex pads x 10 R/L  On 2 airex pads x 10 R/L  On 2 airex pads x 10 R/L Tandem on 1 airex beam x 10 R/L back    Repetitive Sideways Rock and Reach x10 R/L X 10 R/L sustained On 2 airex pads x 10 R/L  On 2 airex pads x 10 R/L  On sand dune x 10 R/L On sand dune x 10 R/L   Functional #1 Sit to Stand BIG x5  X 10 On airex beam with 3# hand weights  On airex beam with 3# hand weights  Off SB c 2# weights x 10 On sand dune with 2# hand weights x 10   Functional #2 Jogging/lateral shuffle  Big lateral shuffles R/L with tap to cones x 5  Big  grapevines with kickouts x 3 laps  Weighted cable resisted fwd walking, 30# x 12  Weighted cable resisted retro walking, 30# x 12  Weighted cable resisted side step, 20# x 8 R/L HiMAT assessment    Weighted cable resisted fwd walking into lunge on bosu, 30# x 5 R/L  Weighted cable resisted side step into lateral lunge to bosu, 20# x 8 R/L  Weighted cable resisted 30#  *fwd/bwd walking x 5   *Lat R/L walking x 5  *lunge onto bosu ball x 10 each  *lat lunge onto bosu ball x 10 each  *step up onto bosu ball x 10 each  *squat on bosu ball x 10     Running at an obstacle, PT calls out which way to break at last second x ~5 R/L    High knee jogging on mini trampoline 2 x 30 sec   Functional #3 Fine motor manipulation/writing/buttoning/opening small containers Putty manipulation x 5 minutes  Flex bar yellow (u,n, twist x 10 each) Y flexbar N bends x 10 (slow)  Y flexbar U bands x 10 (slow)  Y flexbar flicks (controlled) x 20 R/L RD/UD hammering to spots on mat moving through arc of motion R and L ~50 dots ea  RD/UD hammering in air with big movements x 20 R/L -  Pulling out colored pegs from the peg board with the gripper, using UD and supination to pull x ~25 ea R/L    Functional #4 Balance on 1 leg to don/doff LE  SLS airex runner step ups, no UEs x 10 R/L  SLS catch up to 10, multiple bouts R/L  SLS bosu flat side up runner step ups, no UEs x 10 R/L  SLS on flat bosu with fwd bend and return to standing, 3 x 3 sets R/L SLS bosu flat side up runner step ups with BUE OH reach, 3 sec hold x 10 R/L   SLS hip hinge tap to chair without putting foot down x 5     SLS fwd hop onto airex, holding x 3 sec x 8 R/L   BIG Walking  6MWT - 571 meters BIG walking modelling x 250ft  BIG walking with VC for performance, PT following x 250ft  BIG retro walking with reciprocal UEs, 30ft x 3  BIG side steps with UEs x 10 R/L  BIG walking with dual cog task naming through the alphabet (foods)     BIG walking with dual cog task naming through  the alphabet with patient also recalling place in alphabet (animals) BIG walking with dual cog task (food/places) BIG walking around 115ft loop with calling out variety of gait tasks, attempting to keep up speed x 3 laps  BIG walking keeping up speed around 115ft lap with hurdles (6) and airex (3) x 2 towards R x 2 towards L  Fwd/retro BIG walking with EC, 50ft x 2 ea   Hierarchy Task pickleball  - BIG fwd and retro walking with multi-direct catch, 50ft ea way x 2 laps     Over the fence under the fence x 1 long lap BIG retro walking with multi-direct catch, 50ft ea way x 2 laps   Fwd jogging with tap to dot as called at random, retro jog return x 8; added calling out pickleball shots at random when at the dot x 8 Rebounder *2# MB fwd/bwd target jogging with throwing ball  *2# MB lat side shuffle with ball throw at rebounder  Weighted cable chopping, 20# x 10 R/L    EC on airex training, various positions and lengths of hold romberg to tandem      DL fwd/retro  and R/L hops on mini trampoline x ~20 ea     Scifit 3'F3'B  Sand dune  *step up x 10  *fwd lunge x 10  *squat x 10  *lat lunge x 10  Wall wipes with SB x 10  Wall push up with SB x 10 Education on home stretching program with options given, modelling and allowing to perform as needed, UE, LE, cervical including DNF training x 15 min -  Bridge c SB x 15  Abdominal iso with SB all 4s x 10  Abdominal iso with SB diagonal squeeze x 10  Dead bug c SB x 10  LTR c SB x 10  DKTC c SB x 10 -     Seated on SB   *pelvic rot cw/ccw x 10  *march x 10  *knee ext x 10  *black t-band row x 15  *black t-band alt row x 10 each  *black t-band B shoulder ext x 15  *black t-band alt shoulder ext x 10 each  Bridge c SB x 15  DKTC c SB x 15  LTR c SB x 15  Passive stretching B LE by PT (HS, quad, ITB, piriformis, adductors, gastroc) - - Passive stretching B LE by PT (HS, quad, ITB, piriformis, adductors, gastroc) Passive stretching B LE by PT (HS, quad, ITB, piriformis, adductors,  gastroc) Passive stretching B LE by PT (HS, quad, ITB, piriformis, adductors, gastroc)   Daily Carryover: big punching arms into coat    Charges: NR x 2 (30'), Therex x 1 (15'), Gait x 1 (15')    Total Timed Treatment: 60 min  Total Treatment Time: 60 min

## 2025-02-21 ENCOUNTER — OFFICE VISIT (OUTPATIENT)
Dept: PHYSICAL THERAPY | Facility: HOSPITAL | Age: 65
End: 2025-02-21
Payer: COMMERCIAL

## 2025-02-21 PROCEDURE — 97112 NEUROMUSCULAR REEDUCATION: CPT

## 2025-02-21 PROCEDURE — 97116 GAIT TRAINING THERAPY: CPT

## 2025-02-21 NOTE — PROGRESS NOTES
Dx: Parkinson's         Authorized # of Visits:  16         Next MD visit: none scheduled  Fall Risk: standard         Precautions: n/a             Subjective: Patient states continues to do well. She is working out with her son consistently. No other issues to report since last session.     Objective:   (2/12/25):  Refer to flow sheet. Emphasis on Big daily exercises. Posture education with exercises. Education on increasing amplitude of movements and voice projection with counting throughout session.   6 MWT: 571 meters    (2/13/25):  Modelling and shaping maximal daily exercises as needed throughout; focus on amplitude and effort with all motions     (2/14/25):  Progression maximal daily exercises as listed below with compliant surfaces and weights as appropriate     (2/18/25):  HiMAT: 40/54    (2/19/25): Refer to flow sheet. Able to perform daily exercises independently. Added sand dune this date and 2# weights.         Assessment: Tandem stance activities with added difficulty of compliant surface and/or dual task with changing COG offer moderate challenge for patient, but improving with reps. Regarding SLS tasks that result in changing COG such as SLS med ball catch with squat to tap target, patient with consistent greater difficulty R SLS as compared to L. Good ability to respond to external perturbations as result of weighted cable retro bar pulls (including rowing motion) and weighted cable fwd push.      Goals:   Goals: (To be met in 16 visits)  Patient will demonstrate improved ability to interpret vestibular cues by performing tandem on airex with EC x 10 sec. - progress  Patient will demonstrate ability to perform lateral shuffling and fwd/retro jogging without freezing or slowed speed. - progress  Patient will demonstrate improved fine motor coordination by improving R hand score on 9 Hole Peg Test by 5 sec or better. - progress  Patient will improve score on HiMAT MCID 3 pts or better to improve  ability to perform high level activity like pickleball. - progress  Patient will be IND and compliant in HEP to maintain progress made in PT. - issued and progressed    Plan: Progress Big exercises as tolerated. Ladder tasks.    Program Date: 2/12/2025  Tx#: 2/16 Date: 2/13/2025  Tx#: 3/16 Date: 2/14/2025  Tx#: 4/16 Date: 2/18/2025  Tx#: 5/16 Date: 2/19/25  Tx: 6/16 Date: 2/20/25  Tx: 7/16 Date: 2/20/25  Tx: 8/16   Sustained  Floor to Ceiling  X4 with flicks X5 with flicks Seated on SB with flicks x 5 Seated on SB with flicks x 5 Seated on SB with 2# weights x 5 Seated on SB with 2# weights x 5 Seated on SB with 2# weights x 5   Sustained  Side to Side x4 R/L with flicks X5 R/L with flicks  Seated on SB with flicks x 5 R/L Seated on SB with flicks x 5 R/L Seated  on SB c 2# weights x 5 Seated  on SB with 2# weights x 5 Seated on SB with 2# weights x 5 R/L   Repetitive Forward Step  x4 R/L X 10 R/L  Onto airex with 3# hand weights x 10 R/L  Onto airex with 3# hand weights x 10 R/L  Onto sand dune with 2# weights x 10 R/L Onto sand dune with 2# weights x 10 R/L Onto sand dune with 2# weights x 10 R/L   Repetitive Sideways Step x4 R/L X 10 R/L  Onto airex with 3# hand weights x 10 R/L  Onto airex with 3# hand weights x 10 R/L  Onto sand dune with 2# weights x 10 R/L Onto sand dune with 2# weights x 10 R/L Onto sand dune with 2# weights x 10 R/L   Repetitive Backward Step x4 R/L X 10 R/L Onto airex x 10 R/L Onto airex x 10 R/L Off sand dune with 2# weights x 10 R/L Off sand dune with 2# weights x 10 R/L Off sand dune with 2# weights x 10 R/L   Repetitive Forward Rock and Reach x10 R/L X 10 R/L On 2 airex pads x 10 R/L  On 2 airex pads x 10 R/L  On 2 airex pads x 10 R/L Tandem on 1 airex beam x 10 R/L back  Tandem on 1 airex beam x 10 R/L back    Repetitive Sideways Rock and Reach x10 R/L X 10 R/L sustained On 2 airex pads x 10 R/L  On 2 airex pads x 10 R/L  On sand dune x 10 R/L On sand dune x 10 R/L On airex beam with  2# weights x 10 R/L    Functional #1 Sit to Stand BIG x5  X 10 On airex beam with 3# hand weights  On airex beam with 3# hand weights  Off SB c 2# weights x 10 On sand dune with 2# hand weights x 10 On sand dune with 6# med ball OH lift x 10   Functional #2 Jogging/lateral shuffle  Big lateral shuffles R/L with tap to cones x 5  Big grapevines with kickouts x 3 laps  Weighted cable resisted fwd walking, 30# x 12  Weighted cable resisted retro walking, 30# x 12  Weighted cable resisted side step, 20# x 8 R/L HiMAT assessment    Weighted cable resisted fwd walking into lunge on bosu, 30# x 5 R/L  Weighted cable resisted side step into lateral lunge to bosu, 20# x 8 R/L  Weighted cable resisted 30#  *fwd/bwd walking x 5   *Lat R/L walking x 5  *lunge onto bosu ball x 10 each  *lat lunge onto bosu ball x 10 each  *step up onto bosu ball x 10 each  *squat on bosu ball x 10     Running at an obstacle, PT calls out which way to break at last second x ~5 R/L    High knee jogging on mini trampoline 2 x 30 sec -   Functional #3 Fine motor manipulation/writing/buttoning/opening small containers Putty manipulation x 5 minutes  Flex bar yellow (u,n, twist x 10 each) Y flexbar N bends x 10 (slow)  Y flexbar U bands x 10 (slow)  Y flexbar flicks (controlled) x 20 R/L RD/UD hammering to spots on mat moving through arc of motion R and L ~50 dots ea  RD/UD hammering in air with big movements x 20 R/L -  Pulling out colored pegs from the peg board with the gripper, using UD and supination to pull x ~25 ea R/L  Velcro board large cylinder rolling up and down in \"can opener\" motion x 5 up/down  Velcro board key turn up and down board x 2 laps ea R/L  Y flexbar N bends x 10 (slow)  Y flexbar U bands x 10 (slow)   Functional #4 Balance on 1 leg to don/doff LE  SLS airex runner step ups, no UEs x 10 R/L  SLS catch up to 10, multiple bouts R/L  SLS bosu flat side up runner step ups, no UEs x 10 R/L  SLS on flat bosu with fwd bend and return  to standing, 3 x 3 sets R/L SLS bosu flat side up runner step ups with BUE OH reach, 3 sec hold x 10 R/L   SLS hip hinge tap to chair without putting foot down x 5     SLS fwd hop onto airex, holding x 3 sec x 8 R/L SLS catch 2# ball with squat to tap cones (3) on 6\" box x 3 sets R/L   SLS 2# ball catch x 3 ea set, not putting LE down x 3 sets R/L    BIG Walking  6MWT - 571 meters BIG walking modelling x 250ft  BIG walking with VC for performance, PT following x 250ft  BIG retro walking with reciprocal UEs, 30ft x 3  BIG side steps with UEs x 10 R/L  BIG walking with dual cog task naming through the alphabet (foods)     BIG walking with dual cog task naming through the alphabet with patient also recalling place in alphabet (animals) BIG walking with dual cog task (food/places) BIG walking around 115ft loop with calling out variety of gait tasks, attempting to keep up speed x 3 laps  BIG walking keeping up speed around 115ft lap with hurdles (6) and airex (3) x 2 towards R x 2 towards L  Fwd/retro BIG walking with EC, 50ft x 2 ea Fwd around the 115ft [place] into retro around [place], into lateral half of ea [place] x 2 sets      Hierarchy Task pickleball  - BIG fwd and retro walking with multi-direct catch, 50ft ea way x 2 laps     Over the fence under the fence x 1 long lap BIG retro walking with multi-direct catch, 50ft ea way x 2 laps   Fwd jogging with tap to dot as called at random, retro jog return x 8; added calling out pickleball shots at random when at the dot x 8 Rebounder *2# MB fwd/bwd target jogging with throwing ball  *2# MB lat side shuffle with ball throw at rebounder  Weighted cable chopping, 20# x 10 R/L    EC on airex training, various positions and lengths of hold romberg to tandem      DL fwd/retro  and R/L hops on mini trampoline x ~20 ea Weighted cable resisted retro walkout with bar, 50# total x 5; with rowing motion fwd and retro x 8  Weighted cable resisted fwd push with bar, 50# total x 8      Scifit 3'F3'B  Sand dune  *step up x 10  *fwd lunge x 10  *squat x 10  *lat lunge x 10  Wall wipes with SB x 10  Wall push up with SB x 10 Education on home stretching program with options given, modelling and allowing to perform as needed, UE, LE, cervical including DNF training x 15 min -  Bridge c SB x 15  Abdominal iso with SB all 4s x 10  Abdominal iso with SB diagonal squeeze x 10  Dead bug c SB x 10  LTR c SB x 10  DKTC c SB x 10 - -     Seated on SB   *pelvic rot cw/ccw x 10  *march x 10  *knee ext x 10  *black t-band row x 15  *black t-band alt row x 10 each  *black t-band B shoulder ext x 15  *black t-band alt shoulder ext x 10 each  Bridge c SB x 15  DKTC c SB x 15  LTR c SB x 15  Passive stretching B LE by PT (HS, quad, ITB, piriformis, adductors, gastroc) - - Passive stretching B LE by PT (HS, quad, ITB, piriformis, adductors, gastroc) Passive stretching B LE by PT (HS, quad, ITB, piriformis, adductors, gastroc) Passive stretching B LE by PT (HS, quad, ITB, piriformis, adductors, gastroc) -   Daily Carryover: big punching arms into coat    Charges: NR x 3 (40'), Gait x 1 (15')    Total Timed Treatment: 55 min  Total Treatment Time: 55 min

## 2025-02-25 ENCOUNTER — OFFICE VISIT (OUTPATIENT)
Dept: PHYSICAL THERAPY | Facility: HOSPITAL | Age: 65
End: 2025-02-25
Payer: COMMERCIAL

## 2025-02-25 PROCEDURE — 97112 NEUROMUSCULAR REEDUCATION: CPT

## 2025-02-25 PROCEDURE — 97110 THERAPEUTIC EXERCISES: CPT

## 2025-02-25 NOTE — PROGRESS NOTES
Dx: Parkinson's         Authorized # of Visits:  16         Next MD visit: none scheduled  Fall Risk: standard         Precautions: n/a             Subjective: Patient states she has been doing well since last visit. Had a boxing session with  yesterday that she enjoyed and will continue to do weekly. States tired today due to personal family issues.     Objective:   (2/12/25):  Refer to flow sheet. Emphasis on Big daily exercises. Posture education with exercises. Education on increasing amplitude of movements and voice projection with counting throughout session.   6 MWT: 571 meters    (2/13/25):  Modelling and shaping maximal daily exercises as needed throughout; focus on amplitude and effort with all motions     (2/14/25):  Progression maximal daily exercises as listed below with compliant surfaces and weights as appropriate     (2/18/25):  HiMAT: 40/54    (2/19/25): Refer to flow sheet. Able to perform daily exercises independently. Added sand dune this date and 2# weights.     (2/25/25):  Progression maximal daily exercises with modelling, shaping, and VC as needed for proper performance to continue to appropriately challenge patient        Assessment: Patient able to progress SLS activities with compliant surface and/or dual task. For example, SLS RDL with 6# with low cone target was specifically challenging with multiple LOB, recovered IND with stepping strategy as needed. Initiated agility based activities this date, required multiple verbal cues to improve step length, tendency to perform with multiple small steps, no LOB or freezing with this, well-controlled, but difficulty increasing amplitude when cued; may be related to coordination of task as opposed to symptom of PD, will continue to assess.       Goals:   Goals: (To be met in 16 visits)  Patient will demonstrate improved ability to interpret vestibular cues by performing tandem on airex with EC x 10 sec. - progress  Patient will  demonstrate ability to perform lateral shuffling and fwd/retro jogging without freezing or slowed speed. - progress  Patient will demonstrate improved fine motor coordination by improving R hand score on 9 Hole Peg Test by 5 sec or better. - progress  Patient will improve score on HiMAT MCID 3 pts or better to improve ability to perform high level activity like pickleball. - progress  Patient will be IND and compliant in HEP to maintain progress made in PT. - issued and progressed    Plan: Continue PT with progression as indicated. Updated progress note needed next session.    Program Date: 2/12/2025  Tx#: 2/16 Date: 2/13/2025  Tx#: 3/16 Date: 2/14/2025  Tx#: 4/16 Date: 2/18/2025  Tx#: 5/16 Date: 2/19/25  Tx: 6/16 Date: 2/20/25  Tx: 7/16 Date: 2/21/25  Tx: 8/16 Date: 2/25/25  Tx: 9/16   Sustained  Floor to Ceiling  X4 with flicks X5 with flicks Seated on SB with flicks x 5 Seated on SB with flicks x 5 Seated on SB with 2# weights x 5 Seated on SB with 2# weights x 5 Seated on SB with 2# weights x 5 Seated on SB with 2# weights x 5   Sustained  Side to Side x4 R/L with flicks X5 R/L with flicks  Seated on SB with flicks x 5 R/L Seated on SB with flicks x 5 R/L Seated  on SB c 2# weights x 5 Seated  on SB with 2# weights x 5 Seated on SB with 2# weights x 5 R/L Seated on SB with 2# weights x 5 R/L   Repetitive Forward Step  x4 R/L X 10 R/L  Onto airex with 3# hand weights x 10 R/L  Onto airex with 3# hand weights x 10 R/L  Onto sand dune with 2# weights x 10 R/L Onto sand dune with 2# weights x 10 R/L Onto sand dune with 2# weights x 10 R/L On airex stepping onto bosu with 2# weights x 10 R/L   Repetitive Sideways Step x4 R/L X 10 R/L  Onto airex with 3# hand weights x 10 R/L  Onto airex with 3# hand weights x 10 R/L  Onto sand dune with 2# weights x 10 R/L Onto sand dune with 2# weights x 10 R/L Onto sand dune with 2# weights x 10 R/L On airex stepping onto bosu with 2# weights x 10 R/L   Repetitive Backward Step x4  R/L X 10 R/L Onto airex x 10 R/L Onto airex x 10 R/L Off sand dune with 2# weights x 10 R/L Off sand dune with 2# weights x 10 R/L Off sand dune with 2# weights x 10 R/L Off bosu with 2# weights x 10 R/L   Repetitive Forward Rock and Reach x10 R/L X 10 R/L On 2 airex pads x 10 R/L  On 2 airex pads x 10 R/L  On 2 airex pads x 10 R/L Tandem on 1 airex beam x 10 R/L back  Tandem on 1 airex beam x 10 R/L back  Tandem on 1 airex beam with 2# x 10 R/L back    Repetitive Sideways Rock and Reach x10 R/L X 10 R/L sustained On 2 airex pads x 10 R/L  On 2 airex pads x 10 R/L  On sand dune x 10 R/L On sand dune x 10 R/L On airex beam with 2# weights x 10 R/L  Tandem on 1 airex beam with 2# x 10 R/L back    Functional #1 Sit to Stand BIG x5  X 10 On airex beam with 3# hand weights  On airex beam with 3# hand weights  Off SB c 2# weights x 10 On sand dune with 2# hand weights x 10 On sand dune with 6# med ball OH lift x 10 On upside down BOSU x10   Functional #2 Jogging/lateral shuffle  Big lateral shuffles R/L with tap to cones x 5  Big grapevines with kickouts x 3 laps  Weighted cable resisted fwd walking, 30# x 12  Weighted cable resisted retro walking, 30# x 12  Weighted cable resisted side step, 20# x 8 R/L HiMAT assessment    Weighted cable resisted fwd walking into lunge on bosu, 30# x 5 R/L  Weighted cable resisted side step into lateral lunge to bosu, 20# x 8 R/L  Weighted cable resisted 30#  *fwd/bwd walking x 5   *Lat R/L walking x 5  *lunge onto bosu ball x 10 each  *lat lunge onto bosu ball x 10 each  *step up onto bosu ball x 10 each  *squat on bosu ball x 10     Running at an obstacle, PT calls out which way to break at last second x ~5 R/L    High knee jogging on mini trampoline 2 x 30 sec - Running fwd/bwd between hurdles x2    Bigger steps fwd/bwd between hurdles x2    Lateral shuffle between hurdles x2    Functional #3 Fine motor manipulation/writing/buttoning/opening small containers Putty manipulation x 5  minutes  Flex bar yellow (u,n, twist x 10 each) Y flexbar N bends x 10 (slow)  Y flexbar U bands x 10 (slow)  Y flexbar flicks (controlled) x 20 R/L RD/UD hammering to spots on mat moving through arc of motion R and L ~50 dots ea  RD/UD hammering in air with big movements x 20 R/L -  Pulling out colored pegs from the peg board with the gripper, using UD and supination to pull x ~25 ea R/L  Velcro board large cylinder rolling up and down in \"can opener\" motion x 5 up/down  Velcro board key turn up and down board x 2 laps ea R/L  Y flexbar N bends x 10 (slow)  Y flexbar U bands x 10 (slow) Velcro cube finger extension x3 each finger     SLS balance airex, shoulder abduction with digi-flex each individual digit in sequence in arc OH x 10 R/L   Functional #4 Balance on 1 leg to don/doff LE  SLS airex runner step ups, no UEs x 10 R/L  SLS catch up to 10, multiple bouts R/L  SLS bosu flat side up runner step ups, no UEs x 10 R/L  SLS on flat bosu with fwd bend and return to standing, 3 x 3 sets R/L SLS bosu flat side up runner step ups with BUE OH reach, 3 sec hold x 10 R/L   SLS hip hinge tap to chair without putting foot down x 5     SLS fwd hop onto airex, holding x 3 sec x 8 R/L SLS catch 2# ball with squat to tap cones (3) on 6\" box x 3 sets R/L   SLS 2# ball catch x 3 ea set, not putting LE down x 3 sets R/L  SLS on airex 4# ball throw to rebounder x10 R/L    SLS RDL 6# to cone x 10 R/L    BIG Walking  6MWT - 571 meters BIG walking modelling x 250ft  BIG walking with VC for performance, PT following x 250ft  BIG retro walking with reciprocal UEs, 30ft x 3  BIG side steps with UEs x 10 R/L  BIG walking with dual cog task naming through the alphabet (foods)     BIG walking with dual cog task naming through the alphabet with patient also recalling place in alphabet (animals) BIG walking with dual cog task (food/places) BIG walking around 115ft loop with calling out variety of gait tasks, attempting to keep up speed x 3  laps  BIG walking keeping up speed around 115ft lap with hurdles (6) and airex (3) x 2 towards R x 2 towards L  Fwd/retro BIG walking with EC, 50ft x 2 ea Fwd around the 115ft [place] into retro around [place], into lateral half of ea [place] x 2 sets    -   Hierarchy Task pickleball  - BIG fwd and retro walking with multi-direct catch, 50ft ea way x 2 laps     Over the fence under the fence x 1 long lap BIG retro walking with multi-direct catch, 50ft ea way x 2 laps   Fwd jogging with tap to dot as called at random, retro jog return x 8; added calling out pickleball shots at random when at the dot x 8 Rebounder *2# MB fwd/bwd target jogging with throwing ball  *2# MB lat side shuffle with ball throw at rebounder  Weighted cable chopping, 20# x 10 R/L    EC on airex training, various positions and lengths of hold romberg to tandem      DL fwd/retro  and R/L hops on mini trampoline x ~20 ea Weighted cable resisted retro walkout with bar, 50# total x 5; with rowing motion fwd and retro x 8  Weighted cable resisted fwd push with bar, 50# total x 8 Weighted cable up chopping 20# 10x R/L    Box jumps in // bars up and down from 4\", 6\", 6\", down into held squat x 3 laps     Scifit 3'F3'B  Sand dune  *step up x 10  *fwd lunge x 10  *squat x 10  *lat lunge x 10  Wall wipes with SB x 10  Wall push up with SB x 10 Education on home stretching program with options given, modelling and allowing to perform as needed, UE, LE, cervical including DNF training x 15 min -  Bridge c SB x 15  Abdominal iso with SB all 4s x 10  Abdominal iso with SB diagonal squeeze x 10  Dead bug c SB x 10  LTR c SB x 10  DKTC c SB x 10 - -      Seated on SB   *pelvic rot cw/ccw x 10  *march x 10  *knee ext x 10  *black t-band row x 15  *black t-band alt row x 10 each  *black t-band B shoulder ext x 15  *black t-band alt shoulder ext x 10 each  Bridge c SB x 15  DKTC c SB x 15  LTR c SB x 15  Passive stretching B LE by PT (HS, quad, ITB, piriformis,  adductors, gastroc) - - Passive stretching B LE by PT (HS, quad, ITB, piriformis, adductors, gastroc) Passive stretching B LE by PT (HS, quad, ITB, piriformis, adductors, gastroc) Passive stretching B LE by PT (HS, quad, ITB, piriformis, adductors, gastroc) - Passive stretching B LE by PT (HS, quad, ITB, piriformis, adductors, gastroc)   Daily Carryover: big punching arms into coat    Charges: NR x 3 (40') Therex x 1 (15') Total Timed Treatment: 55 min  Total Treatment Time: 55 min

## 2025-02-26 ENCOUNTER — OFFICE VISIT (OUTPATIENT)
Dept: PHYSICAL THERAPY | Facility: HOSPITAL | Age: 65
End: 2025-02-26
Payer: COMMERCIAL

## 2025-02-26 PROCEDURE — 97110 THERAPEUTIC EXERCISES: CPT

## 2025-02-26 PROCEDURE — 97112 NEUROMUSCULAR REEDUCATION: CPT

## 2025-02-26 NOTE — PROGRESS NOTES
Dx: Parkinson's         Authorized # of Visits:  16         Next MD visit: none scheduled  Fall Risk: standard         Precautions: n/a            Progress Summary  Pt has attended 10 visits in Physical Therapy.     Subjective: Pt. Reports overall 50% improvement. Pt. Can really feel the difference with doing her exercises and moving better. Feels PT is really helping her.     Objective:   Sensation: light touch intact, reports intermittent tingling R rays 3-5.   Coordination:    Alt Hand Flip: impaired ability to perform full supination and perform at fast speed maintaining appropriate pattern   Alt Finger to Nose (own nose): WNL   Nose to Moving PT Finger: min impaired accuracy R   Alt Toe Tap: WNL   KURT Toe Tap: WNL    Flexibility: Tightness in L > R HS, piriformis, and ITB    Tone: WNL  UE and LE AROM: Grossly WNL  LE MMT: Grossly WNL; 5/5 throughout BLEs   strength: R (dominant) 54.6#; L 49.6#  Pinch: R 14 pounds, L 13 pounds    Mobility / Transfers Level of Assistance   Sit --> Supine IND   Bed Rolling IND   Supine --> Sit IND   Sit --> Stand IND   Stand --> Sit IND   Chair --> Chair IND     Postural Control:   Romberg on level EO: >30 sec; level EC: >30 sec   Romberg on compliant EO: >30 sec; compliant EC: 30sec   Tandem Stance R back: 30 sec, L back: 30 sec  SLS: R 30 sec, L 30 sec    Biodex: postural stability: 2.7 overall stability: A/P stability: 1.6, M/L stability 2.1  SLS stability testing: R LE overall stability 2.1, A/P 1.2, M/L 1.5                                     L LE overall stability 3.6, A/P 2.1, M/L 2.6    ADL Assessment:   9 Hole Peg Test: R (dominant) 18.85 sec; L 17.30 sec     Functional Mobility:  30 sec sit<>stand: 21x Fall Risk: no  Gait: pt ambulates on level ground with min fwd bent posturing, dec UE swing KURT; TM: 3pmh R step length 76cm, L 71cm; % of time on R 51%, L 49%  Timed \"Up and Go\": 5.44 sec  TUG Carry (10# crate): 5.39 sec  TUG Cognitive (count backwards from 100 by  7s): 5.98sec   Dual Task Cost: 0%  FGA Score: 30/30;   HiMAT: 40/54  5 MWT: 580Meters      Assessment:  Patient showing improvement in overall functional mobility, balance, strength and gait. Progressing well toward goals. Showing carry-over and improvement in amplitude of movements with all exercises.     Goals:   Goals: (To be met in 16 visits)  Patient will demonstrate improved ability to interpret vestibular cues by performing tandem on airex with EC x 10 sec. - progress  Patient will demonstrate ability to perform lateral shuffling and fwd/retro jogging without freezing or slowed speed. - progress  Patient will demonstrate improved fine motor coordination by improving R hand score on 9 Hole Peg Test by 5 sec or better. - Met  Patient will improve score on HiMAT MCID 3 pts or better to improve ability to perform high level activity like pickleball. - progress  Patient will be IND and compliant in HEP to maintain progress made in PT. - issued and progressed      Plan: Continue skilled Physical Therapy 4 x/week or a total of 16 visits over a 90 day period. Treatment will include: Neuromuscular re-education, gait training, and therapeutic exercise of LSVT Protocol Standard Maximal Daily Exercises and Functional Task Training in combination with Pt. education for posture and body mechanics, and HEP instruction and progression         Patient/Family/Caregiver was advised of these findings, precautions, and treatment options and has agreed to actively participate in planning and for this course of care.    Thank you for your referral. If you have any questions, please contact me at Dept: [phone].    Sincerely,  Electronically signed by therapist: Cira Lopez, PT     Physician's certification required:  Yes  Please co-sign or sign and return this letter via fax as soon as possible to [phone].   I certify the need for these services furnished under this plan of treatment and while under my  care.    X___________________________________________________ Date____________________    Certification From: 2/26/2025  To:5/27/2025      Program Date: 2/12/2025  Tx#: 2/16 Date: 2/13/2025  Tx#: 3/16 Date: 2/14/2025  Tx#: 4/16 Date: 2/18/2025  Tx#: 5/16 Date: 2/19/25  Tx: 6/16 Date: 2/20/25  Tx: 7/16 Date: 2/21/25  Tx: 8/16 Date: 2/25/25  Tx: 9/16 Date: 2/26/25  Tx: 10/16   Sustained  Floor to Ceiling  X4 with flicks X5 with flicks Seated on SB with flicks x 5 Seated on SB with flicks x 5 Seated on SB with 2# weights x 5 Seated on SB with 2# weights x 5 Seated on SB with 2# weights x 5 Seated on SB with 2# weights x 5 Seated on SB with 2# weights x 5   Sustained  Side to Side x4 R/L with flicks X5 R/L with flicks  Seated on SB with flicks x 5 R/L Seated on SB with flicks x 5 R/L Seated  on SB c 2# weights x 5 Seated  on SB with 2# weights x 5 Seated on SB with 2# weights x 5 R/L Seated on SB with 2# weights x 5 R/L Seated on SB with 2# weights x 5 R/L   Repetitive Forward Step  x4 R/L X 10 R/L  Onto airex with 3# hand weights x 10 R/L  Onto airex with 3# hand weights x 10 R/L  Onto sand dune with 2# weights x 10 R/L Onto sand dune with 2# weights x 10 R/L Onto sand dune with 2# weights x 10 R/L On airex stepping onto bosu with 2# weights x 10 R/L On airex stepping onto bosu with 2# weights x 10 R/L   Repetitive Sideways Step x4 R/L X 10 R/L  Onto airex with 3# hand weights x 10 R/L  Onto airex with 3# hand weights x 10 R/L  Onto sand dune with 2# weights x 10 R/L Onto sand dune with 2# weights x 10 R/L Onto sand dune with 2# weights x 10 R/L On airex stepping onto bosu with 2# weights x 10 R/L On airex stepping onto bosu with 2# weights x 10 R/L   Repetitive Backward Step x4 R/L X 10 R/L Onto airex x 10 R/L Onto airex x 10 R/L Off sand dune with 2# weights x 10 R/L Off sand dune with 2# weights x 10 R/L Off sand dune with 2# weights x 10 R/L Off bosu with 2# weights x 10 R/L Off bosu with 2# weights x 10 R/L    Repetitive Forward Rock and Reach x10 R/L X 10 R/L On 2 airex pads x 10 R/L  On 2 airex pads x 10 R/L  On 2 airex pads x 10 R/L Tandem on 1 airex beam x 10 R/L back  Tandem on 1 airex beam x 10 R/L back  Tandem on 1 airex beam with 2# x 10 R/L back  Tandem on 1 airex beam with 2# x 10 R/L back    Repetitive Sideways Rock and Reach x10 R/L X 10 R/L sustained On 2 airex pads x 10 R/L  On 2 airex pads x 10 R/L  On sand dune x 10 R/L On sand dune x 10 R/L On airex beam with 2# weights x 10 R/L  Tandem on 1 airex beam with 2# x 10 R/L back  Tandem on 1 airex beam with 2# x 10 R/L back    Functional #1 Sit to Stand BIG x5  X 10 On airex beam with 3# hand weights  On airex beam with 3# hand weights  Off SB c 2# weights x 10 On sand dune with 2# hand weights x 10 On sand dune with 6# med ball OH lift x 10 On upside down BOSU x10 On upside down BOSU x10   Functional #2 Jogging/lateral shuffle  Big lateral shuffles R/L with tap to cones x 5  Big grapevines with kickouts x 3 laps  Weighted cable resisted fwd walking, 30# x 12  Weighted cable resisted retro walking, 30# x 12  Weighted cable resisted side step, 20# x 8 R/L HiMAT assessment    Weighted cable resisted fwd walking into lunge on bosu, 30# x 5 R/L  Weighted cable resisted side step into lateral lunge to bosu, 20# x 8 R/L  Weighted cable resisted 30#  *fwd/bwd walking x 5   *Lat R/L walking x 5  *lunge onto bosu ball x 10 each  *lat lunge onto bosu ball x 10 each  *step up onto bosu ball x 10 each  *squat on bosu ball x 10     Running at an obstacle, PT calls out which way to break at last second x ~5 R/L    High knee jogging on mini trampoline 2 x 30 sec - Running fwd/bwd between hurdles x2    Bigger steps fwd/bwd between hurdles x2    Lateral shuffle between hurdles x2  REASSESS   Functional #3 Fine motor manipulation/writing/buttoning/opening small containers Putty manipulation x 5 minutes  Flex bar yellow (u,n, twist x 10 each) Y flexbar N bends x 10 (slow)  Y  flexbar U bands x 10 (slow)  Y flexbar flicks (controlled) x 20 R/L RD/UD hammering to spots on mat moving through arc of motion R and L ~50 dots ea  RD/UD hammering in air with big movements x 20 R/L -  Pulling out colored pegs from the peg board with the gripper, using UD and supination to pull x ~25 ea R/L  Velcro board large cylinder rolling up and down in \"can opener\" motion x 5 up/down  Velcro board key turn up and down board x 2 laps ea R/L  Y flexbar N bends x 10 (slow)  Y flexbar U bands x 10 (slow) Velcro cube finger extension x3 each finger     SLS balance airex, shoulder abduction with digi-flex each individual digit in sequence in arc OH x 10 R/L    Functional #4 Balance on 1 leg to don/doff LE  SLS airex runner step ups, no UEs x 10 R/L  SLS catch up to 10, multiple bouts R/L  SLS bosu flat side up runner step ups, no UEs x 10 R/L  SLS on flat bosu with fwd bend and return to standing, 3 x 3 sets R/L SLS bosu flat side up runner step ups with BUE OH reach, 3 sec hold x 10 R/L   SLS hip hinge tap to chair without putting foot down x 5     SLS fwd hop onto airex, holding x 3 sec x 8 R/L SLS catch 2# ball with squat to tap cones (3) on 6\" box x 3 sets R/L   SLS 2# ball catch x 3 ea set, not putting LE down x 3 sets R/L  SLS on airex 4# ball throw to rebounder x10 R/L    SLS RDL 6# to cone x 10 R/L     BIG Walking  6MWT - 571 meters BIG walking modelling x 250ft  BIG walking with VC for performance, PT following x 250ft  BIG retro walking with reciprocal UEs, 30ft x 3  BIG side steps with UEs x 10 R/L  BIG walking with dual cog task naming through the alphabet (foods)     BIG walking with dual cog task naming through the alphabet with patient also recalling place in alphabet (animals) BIG walking with dual cog task (food/places) BIG walking around 115ft loop with calling out variety of gait tasks, attempting to keep up speed x 3 laps  BIG walking keeping up speed around 115ft lap with hurdles (6) and airex  (3) x 2 towards R x 2 towards L  Fwd/retro BIG walking with EC, 50ft x 2 ea Fwd around the 115ft [place] into retro around [place], into lateral half of ea [place] x 2 sets    -    Hierarchy Task pickleball  - BIG fwd and retro walking with multi-direct catch, 50ft ea way x 2 laps     Over the fence under the fence x 1 long lap BIG retro walking with multi-direct catch, 50ft ea way x 2 laps   Fwd jogging with tap to dot as called at random, retro jog return x 8; added calling out pickleball shots at random when at the dot x 8 Rebounder *2# MB fwd/bwd target jogging with throwing ball  *2# MB lat side shuffle with ball throw at rebounder  Weighted cable chopping, 20# x 10 R/L    EC on airex training, various positions and lengths of hold romberg to tandem      DL fwd/retro  and R/L hops on mini trampoline x ~20 ea Weighted cable resisted retro walkout with bar, 50# total x 5; with rowing motion fwd and retro x 8  Weighted cable resisted fwd push with bar, 50# total x 8 Weighted cable up chopping 20# 10x R/L    Box jumps in // bars up and down from 4\", 6\", 6\", down into held squat x 3 laps      Scifit 3'F3'B  Sand dune  *step up x 10  *fwd lunge x 10  *squat x 10  *lat lunge x 10  Wall wipes with SB x 10  Wall push up with SB x 10 Education on home stretching program with options given, modelling and allowing to perform as needed, UE, LE, cervical including DNF training x 15 min -  Bridge c SB x 15  Abdominal iso with SB all 4s x 10  Abdominal iso with SB diagonal squeeze x 10  Dead bug c SB x 10  LTR c SB x 10  DKTC c SB x 10 - -  Scifit 3'F3'B     Seated on SB   *pelvic rot cw/ccw x 10  *march x 10  *knee ext x 10  *black t-band row x 15  *black t-band alt row x 10 each  *black t-band B shoulder ext x 15  *black t-band alt shoulder ext x 10 each  Bridge c SB x 15  DKTC c SB x 15  LTR c SB x 15  Passive stretching B LE by PT (HS, quad, ITB, piriformis, adductors, gastroc) - - Passive stretching B LE by PT (HS, quad,  ITB, piriformis, adductors, gastroc) Passive stretching B LE by PT (HS, quad, ITB, piriformis, adductors, gastroc) Passive stretching B LE by PT (HS, quad, ITB, piriformis, adductors, gastroc) - Passive stretching B LE by PT (HS, quad, ITB, piriformis, adductors, gastroc) Passive stretching B LE by PT (HS, quad, ITB, piriformis, adductors, gastroc)   Daily Carryover: big signing name when writing    Charges: NR x 2 (30') Therex x 2 (30') Total Timed Treatment: 60 min  Total Treatment Time: 60 min

## 2025-02-27 ENCOUNTER — OFFICE VISIT (OUTPATIENT)
Dept: PHYSICAL THERAPY | Facility: HOSPITAL | Age: 65
End: 2025-02-27
Payer: COMMERCIAL

## 2025-02-27 PROCEDURE — 97110 THERAPEUTIC EXERCISES: CPT

## 2025-02-27 PROCEDURE — 97112 NEUROMUSCULAR REEDUCATION: CPT

## 2025-02-27 PROCEDURE — 97116 GAIT TRAINING THERAPY: CPT

## 2025-02-27 NOTE — PROGRESS NOTES
Dx: Parkinson's         Authorized # of Visits:  16         Next MD visit: none scheduled  Fall Risk: standard         Precautions: n/a             Subjective: Patient states doing well, really enjoyed working on the Biodex last session, She is noticing tremor in the R hand with scooping and stirring her coffee in the morning even after she has taken her medication.     Objective:   Sensation: light touch intact, reports intermittent tingling R rays 3-5.   Coordination:    Alt Hand Flip: impaired ability to perform full supination and perform at fast speed maintaining appropriate pattern   Alt Finger to Nose (own nose): WNL   Nose to Moving PT Finger: min impaired accuracy R   Alt Toe Tap: WNL   KURT Toe Tap: WNL    Flexibility: Tightness in L > R HS, piriformis, and ITB    Tone: WNL  UE and LE AROM: Grossly WNL  LE MMT: Grossly WNL; 5/5 throughout BLEs   strength: R (dominant) 54.6#; L 49.6#  Pinch: R 14 pounds, L 13 pounds    Mobility / Transfers Level of Assistance   Sit --> Supine IND   Bed Rolling IND   Supine --> Sit IND   Sit --> Stand IND   Stand --> Sit IND   Chair --> Chair IND     Postural Control:   Romberg on level EO: >30 sec; level EC: >30 sec   Romberg on compliant EO: >30 sec; compliant EC: 30sec   Tandem Stance R back: 30 sec, L back: 30 sec  SLS: R 30 sec, L 30 sec    Biodex: postural stability: 2.7 overall stability: A/P stability: 1.6, M/L stability 2.1  SLS stability testing: R LE overall stability 2.1, A/P 1.2, M/L 1.5                                     L LE overall stability 3.6, A/P 2.1, M/L 2.6    ADL Assessment:   9 Hole Peg Test: R (dominant) 18.85 sec; L 17.30 sec     Functional Mobility:  30 sec sit<>stand: 21x Fall Risk: no  Gait: pt ambulates on level ground with min fwd bent posturing, dec UE swing KURT; TM: 3pmh R step length 76cm, L 71cm; % of time on R 51%, L 49%  Timed \"Up and Go\": 5.44 sec  TUG Carry (10# crate): 5.39 sec  TUG Cognitive (count backwards from 100 by 7s):  5.98sec   Dual Task Cost: 0%  FGA Score: 30/30;   HiMAT: 40/54  5 MWT: 580Meters      Assessment: Initiated working on ladder drills for coordination and ability to perform both quick and small and quick and big movements. While large amplitude movements are essential for patient, want to be sure that she can also perform small movements without resulting in freezing or LOB in case she needs to perform in a daily or community level task. Offered variety of training tasks to address UE deficit with scooping coffee.       Goals: (To be met in 16 visits)  Patient will demonstrate improved ability to interpret vestibular cues by performing tandem on airex with EC x 10 sec. - progress  Patient will demonstrate ability to perform lateral shuffling and fwd/retro jogging without freezing or slowed speed. - progress  Patient will demonstrate improved fine motor coordination by improving R hand score on 9 Hole Peg Test by 5 sec or better. - Met  Patient will improve score on HiMAT MCID 3 pts or better to improve ability to perform high level activity like pickleball. - progress  Patient will be IND and compliant in HEP to maintain progress made in PT. - issued and progressed      Plan: Continue PT with progression as indicated, outdoor ambulation as tolerated.      Program Date: 2/14/2025  Tx#: 4/16 Date: 2/18/2025  Tx#: 5/16 Date: 2/19/25  Tx: 6/16 Date: 2/20/25  Tx: 7/16 Date: 2/21/25  Tx: 8/16 Date: 2/25/25  Tx: 9/16 Date: 2/26/25  Tx: 10/16 Date: 2/27/25  Tx: 11/16   Sustained  Floor to Ceiling  Seated on SB with flicks x 5 Seated on SB with flicks x 5 Seated on SB with 2# weights x 5 Seated on SB with 2# weights x 5 Seated on SB with 2# weights x 5 Seated on SB with 2# weights x 5 Seated on SB with 2# weights x 5 Seated on SB with 2# weights x 5   Sustained  Side to Side Seated on SB with flicks x 5 R/L Seated on SB with flicks x 5 R/L Seated  on SB c 2# weights x 5 Seated  on SB with 2# weights x 5 Seated on SB with 2#  weights x 5 R/L Seated on SB with 2# weights x 5 R/L Seated on SB with 2# weights x 5 R/L Seated on SB with 2# weights x 5 R/L   Repetitive Forward Step  Onto airex with 3# hand weights x 10 R/L  Onto airex with 3# hand weights x 10 R/L  Onto sand dune with 2# weights x 10 R/L Onto sand dune with 2# weights x 10 R/L Onto sand dune with 2# weights x 10 R/L On airex stepping onto bosu with 2# weights x 10 R/L On airex stepping onto bosu with 2# weights x 10 R/L On airex stepping onto bosu with 2# weights x 10 R/L   Repetitive Sideways Step Onto airex with 3# hand weights x 10 R/L  Onto airex with 3# hand weights x 10 R/L  Onto sand dune with 2# weights x 10 R/L Onto sand dune with 2# weights x 10 R/L Onto sand dune with 2# weights x 10 R/L On airex stepping onto bosu with 2# weights x 10 R/L On airex stepping onto bosu with 2# weights x 10 R/L On airex stepping onto bosu with 2# weights x 10 R/L   Repetitive Backward Step Onto airex x 10 R/L Onto airex x 10 R/L Off sand dune with 2# weights x 10 R/L Off sand dune with 2# weights x 10 R/L Off sand dune with 2# weights x 10 R/L Off bosu with 2# weights x 10 R/L Off bosu with 2# weights x 10 R/L Off bosu with 2# weights x 10 R/L   Repetitive Forward Rock and Reach On 2 airex pads x 10 R/L  On 2 airex pads x 10 R/L  On 2 airex pads x 10 R/L Tandem on 1 airex beam x 10 R/L back  Tandem on 1 airex beam x 10 R/L back  Tandem on 1 airex beam with 2# x 10 R/L back  Tandem on 1 airex beam with 2# x 10 R/L back  Tandem on 1 airex beam with 2# x 10 R/L back    Repetitive Sideways Rock and Reach On 2 airex pads x 10 R/L  On 2 airex pads x 10 R/L  On sand dune x 10 R/L On sand dune x 10 R/L On airex beam with 2# weights x 10 R/L  Tandem on 1 airex beam with 2# x 10 R/L back  Tandem on 1 airex beam with 2# x 10 R/L back  Tandem on 1 airex beam with 2# x 10 R/L back    Functional #1 Sit to Stand BIG On airex beam with 3# hand weights  On airex beam with 3# hand weights  Off SB c 2#  weights x 10 On sand dune with 2# hand weights x 10 On sand dune with 6# med ball OH lift x 10 On upside down BOSU x10 On upside down BOSU x10 On upside down BOSU x10   Functional #2 Jogging/lateral shuffle Weighted cable resisted fwd walking, 30# x 12  Weighted cable resisted retro walking, 30# x 12  Weighted cable resisted side step, 20# x 8 R/L HiMAT assessment    Weighted cable resisted fwd walking into lunge on bosu, 30# x 5 R/L  Weighted cable resisted side step into lateral lunge to bosu, 20# x 8 R/L  Weighted cable resisted 30#  *fwd/bwd walking x 5   *Lat R/L walking x 5  *lunge onto bosu ball x 10 each  *lat lunge onto bosu ball x 10 each  *step up onto bosu ball x 10 each  *squat on bosu ball x 10     Running at an obstacle, PT calls out which way to break at last second x ~5 R/L    High knee jogging on mini trampoline 2 x 30 sec - Running fwd/bwd between hurdles x2    Bigger steps fwd/bwd between hurdles x2    Lateral shuffle between hurdles x2  REASSESS Ladder drills variety of tasks including DLS, SLS, challenge to coordination and jumping/hopping x 10 min   Functional #3 Fine motor manipulation/writing/buttoning/opening small containers RD/UD hammering to spots on mat moving through arc of motion R and L ~50 dots ea  RD/UD hammering in air with big movements x 20 R/L -  Pulling out colored pegs from the peg board with the gripper, using UD and supination to pull x ~25 ea R/L  Velcro board large cylinder rolling up and down in \"can opener\" motion x 5 up/down  Velcro board key turn up and down board x 2 laps ea R/L  Y flexbar N bends x 10 (slow)  Y flexbar U bands x 10 (slow) Velcro cube finger extension x3 each finger     SLS balance airex, shoulder abduction with digi-flex each individual digit in sequence in arc OH x 10 R/L  Y putty tasks to address stirring    Flicks and big scooping to be used at home    Big stirring with 4# hand weight x 15 R/L    Functional #4 Balance on 1 leg to don/doff LE SLS  bosu flat side up runner step ups, no UEs x 10 R/L  SLS on flat bosu with fwd bend and return to standing, 3 x 3 sets R/L SLS bosu flat side up runner step ups with BUE OH reach, 3 sec hold x 10 R/L   SLS hip hinge tap to chair without putting foot down x 5     SLS fwd hop onto airex, holding x 3 sec x 8 R/L SLS catch 2# ball with squat to tap cones (3) on 6\" box x 3 sets R/L   SLS 2# ball catch x 3 ea set, not putting LE down x 3 sets R/L  SLS on airex 4# ball throw to rebounder x10 R/L    SLS RDL 6# to cone x 10 R/L   SLS alt AL reach to ski poles, attempting to tap bottom line, multiple bouts    BIG Walking  BIG walking with dual cog task naming through the alphabet (foods)     BIG walking with dual cog task naming through the alphabet with patient also recalling place in alphabet (animals) BIG walking with dual cog task (food/places) BIG walking around 115ft loop with calling out variety of gait tasks, attempting to keep up speed x 3 laps  BIG walking keeping up speed around 115ft lap with hurdles (6) and airex (3) x 2 towards R x 2 towards L  Fwd/retro BIG walking with EC, 50ft x 2 ea Fwd around the 115ft [place] into retro around [place], into lateral half of ea [place] x 2 sets    -  Big walking with search and fond x 7 cones in clinic, not stopping until found all, minimal hints given as needed   Hierarchy Task pickleball BIG fwd and retro walking with multi-direct catch, 50ft ea way x 2 laps     Over the fence under the fence x 1 long lap BIG retro walking with multi-direct catch, 50ft ea way x 2 laps   Fwd jogging with tap to dot as called at random, retro jog return x 8; added calling out pickleball shots at random when at the dot x 8 Rebounder *2# MB fwd/bwd target jogging with throwing ball  *2# MB lat side shuffle with ball throw at rebounder  Weighted cable chopping, 20# x 10 R/L    EC on airex training, various positions and lengths of hold romberg to tandem      DL fwd/retro  and R/L hops on mini  trampoline x ~20 ea Weighted cable resisted retro walkout with bar, 50# total x 5; with rowing motion fwd and retro x 8  Weighted cable resisted fwd push with bar, 50# total x 8 Weighted cable up chopping 20# 10x R/L    Box jumps in // bars up and down from 4\", 6\", 6\", down into held squat x 3 laps  Supine SB walkout to bridge with alt LE extension x 6    Prone walkout to bridge x 5     -  Bridge c SB x 15  Abdominal iso with SB all 4s x 10  Abdominal iso with SB diagonal squeeze x 10  Dead bug c SB x 10  LTR c SB x 10  DKTC c SB x 10 - -  Scifit 3'F3'B -     - Passive stretching B LE by PT (HS, quad, ITB, piriformis, adductors, gastroc) Passive stretching B LE by PT (HS, quad, ITB, piriformis, adductors, gastroc) Passive stretching B LE by PT (HS, quad, ITB, piriformis, adductors, gastroc) - Passive stretching B LE by PT (HS, quad, ITB, piriformis, adductors, gastroc) Passive stretching B LE by PT (HS, quad, ITB, piriformis, adductors, gastroc) -   Daily Carryover: big signing name when writing    Charges: NR x 2 (30') Therex x 1 (15'), Gait x 1 (10') Total Timed Treatment: 55 min  Total Treatment Time: 55 min

## 2025-02-28 ENCOUNTER — OFFICE VISIT (OUTPATIENT)
Dept: PHYSICAL THERAPY | Facility: HOSPITAL | Age: 65
End: 2025-02-28
Payer: COMMERCIAL

## 2025-02-28 PROCEDURE — 97112 NEUROMUSCULAR REEDUCATION: CPT

## 2025-02-28 PROCEDURE — 97116 GAIT TRAINING THERAPY: CPT

## 2025-02-28 PROCEDURE — 97110 THERAPEUTIC EXERCISES: CPT

## 2025-02-28 NOTE — PROGRESS NOTES
Dx: Parkinson's         Authorized # of Visits:  16         Next MD visit: none scheduled  Fall Risk: standard         Precautions: n/a             Subjective: Patient states feels can go outside today to work on big walking tasks.     Objective:   Sensation: light touch intact, reports intermittent tingling R rays 3-5.   Coordination:    Alt Hand Flip: impaired ability to perform full supination and perform at fast speed maintaining appropriate pattern   Alt Finger to Nose (own nose): WNL   Nose to Moving PT Finger: min impaired accuracy R   Alt Toe Tap: WNL   KURT Toe Tap: WNL    Flexibility: Tightness in L > R HS, piriformis, and ITB    Tone: WNL  UE and LE AROM: Grossly WNL  LE MMT: Grossly WNL; 5/5 throughout BLEs   strength: R (dominant) 54.6#; L 49.6#  Pinch: R 14 pounds, L 13 pounds    Mobility / Transfers Level of Assistance   Sit --> Supine IND   Bed Rolling IND   Supine --> Sit IND   Sit --> Stand IND   Stand --> Sit IND   Chair --> Chair IND     Postural Control:   Romberg on level EO: >30 sec; level EC: >30 sec   Romberg on compliant EO: >30 sec; compliant EC: 30sec   Tandem Stance R back: 30 sec, L back: 30 sec  SLS: R 30 sec, L 30 sec    Biodex: postural stability: 2.7 overall stability: A/P stability: 1.6, M/L stability 2.1  SLS stability testing: R LE overall stability 2.1, A/P 1.2, M/L 1.5                                     L LE overall stability 3.6, A/P 2.1, M/L 2.6    ADL Assessment:   9 Hole Peg Test: R (dominant) 18.85 sec; L 17.30 sec     Functional Mobility:  30 sec sit<>stand: 21x Fall Risk: no  Gait: pt ambulates on level ground with min fwd bent posturing, dec UE swing KURT; TM: 3pmh R step length 76cm, L 71cm; % of time on R 51%, L 49%  Timed \"Up and Go\": 5.44 sec  TUG Carry (10# crate): 5.39 sec  TUG Cognitive (count backwards from 100 by 7s): 5.98sec   Dual Task Cost: 0%  FGA Score: 30/30;   HiMAT: 40/54  5 MWT: 580Meters      Assessment: Initiated outdoor ambulation tasks and was  well tolerated by patient. Able to perform BIG walking fwd and retro over uneven grassy surfaces. Able to maintain large amplitude steps over community distances, up and down grassy hill, and with HT to all directions. No apparent loss of speed, LOB, and maintained confidence. Patient jogged briefly uphill, also with amplitude maintained. Continue to challenge her with a variety of SLS tasks within her tolerance including compliant surfaces, changing COG, responding to external perturbations, and with dual tasking.       Goals: (To be met in 16 visits)  Patient will demonstrate improved ability to interpret vestibular cues by performing tandem on airex with EC x 10 sec. - progress  Patient will demonstrate ability to perform lateral shuffling and fwd/retro jogging without freezing or slowed speed. - progress  Patient will demonstrate improved fine motor coordination by improving R hand score on 9 Hole Peg Test by 5 sec or better. - Met  Patient will improve score on HiMAT MCID 3 pts or better to improve ability to perform high level activity like pickleball. - progress  Patient will be IND and compliant in HEP to maintain progress made in PT. - issued and progressed      Plan: Continue PT with progression as indicated, outdoor ambulation as tolerated.      Program Date: 2/18/2025  Tx#: 5/16 Date: 2/19/25  Tx: 6/16 Date: 2/20/25  Tx: 7/16 Date: 2/21/25  Tx: 8/16 Date: 2/25/25  Tx: 9/16 Date: 2/26/25  Tx: 10/16 Date: 2/27/25  Tx: 11/16 Date: 2/28/25  Tx: 12/16   Sustained  Floor to Ceiling  Seated on SB with flicks x 5 Seated on SB with 2# weights x 5 Seated on SB with 2# weights x 5 Seated on SB with 2# weights x 5 Seated on SB with 2# weights x 5 Seated on SB with 2# weights x 5 Seated on SB with 2# weights x 5 Seated on SB with 2# weights x 5   Sustained  Side to Side Seated on SB with flicks x 5 R/L Seated  on SB c 2# weights x 5 Seated  on SB with 2# weights x 5 Seated on SB with 2# weights x 5 R/L Seated on SB  with 2# weights x 5 R/L Seated on SB with 2# weights x 5 R/L Seated on SB with 2# weights x 5 R/L Seated on SB with 2# weights x 5 R/L   Repetitive Forward Step  Onto airex with 3# hand weights x 10 R/L  Onto sand dune with 2# weights x 10 R/L Onto sand dune with 2# weights x 10 R/L Onto sand dune with 2# weights x 10 R/L On airex stepping onto bosu with 2# weights x 10 R/L On airex stepping onto bosu with 2# weights x 10 R/L On airex stepping onto bosu with 2# weights x 10 R/L On airex stepping onto bosu with 2# weights x 10 R/L   Repetitive Sideways Step Onto airex with 3# hand weights x 10 R/L  Onto sand dune with 2# weights x 10 R/L Onto sand dune with 2# weights x 10 R/L Onto sand dune with 2# weights x 10 R/L On airex stepping onto bosu with 2# weights x 10 R/L On airex stepping onto bosu with 2# weights x 10 R/L On airex stepping onto bosu with 2# weights x 10 R/L On airex stepping onto bosu with 2# weights x 10 R/L   Repetitive Backward Step Onto airex x 10 R/L Off sand dune with 2# weights x 10 R/L Off sand dune with 2# weights x 10 R/L Off sand dune with 2# weights x 10 R/L Off bosu with 2# weights x 10 R/L Off bosu with 2# weights x 10 R/L Off bosu with 2# weights x 10 R/L Off bosu with 2# weights x 10 R/L   Repetitive Forward Rock and Reach On 2 airex pads x 10 R/L  On 2 airex pads x 10 R/L Tandem on 1 airex beam x 10 R/L back  Tandem on 1 airex beam x 10 R/L back  Tandem on 1 airex beam with 2# x 10 R/L back  Tandem on 1 airex beam with 2# x 10 R/L back  Tandem on 1 airex beam with 2# x 10 R/L back  Tandem on 1 airex beam with 2# x 10 R/L back    Repetitive Sideways Rock and Reach On 2 airex pads x 10 R/L  On sand dune x 10 R/L On sand dune x 10 R/L On airex beam with 2# weights x 10 R/L  Tandem on 1 airex beam with 2# x 10 R/L back  Tandem on 1 airex beam with 2# x 10 R/L back  Tandem on 1 airex beam with 2# x 10 R/L back  Tandem on 1 airex beam with 2# x 10 R/L back    Functional #1 Sit to Stand BIG  On airex beam with 3# hand weights  Off SB c 2# weights x 10 On sand dune with 2# hand weights x 10 On sand dune with 6# med ball OH lift x 10 On upside down BOSU x10 On upside down BOSU x10 On upside down BOSU x10 On upside down bosu with 4# med ball catch x 12   Functional #2 Jogging/lateral shuffle HiMAT assessment    Weighted cable resisted fwd walking into lunge on bosu, 30# x 5 R/L  Weighted cable resisted side step into lateral lunge to bosu, 20# x 8 R/L  Weighted cable resisted 30#  *fwd/bwd walking x 5   *Lat R/L walking x 5  *lunge onto bosu ball x 10 each  *lat lunge onto bosu ball x 10 each  *step up onto bosu ball x 10 each  *squat on bosu ball x 10     Running at an obstacle, PT calls out which way to break at last second x ~5 R/L    High knee jogging on mini trampoline 2 x 30 sec - Running fwd/bwd between hurdles x2    Bigger steps fwd/bwd between hurdles x2    Lateral shuffle between hurdles x2  REASSESS Ladder drills variety of tasks including DLS, SLS, challenge to coordination and jumping/hopping x 10 min SLS 3-way dot tap, multiple bouts on R and L    Bosu flat side up SLS shifting fwd/retro x 10 R/L     Single leg lateral hops onto airex (3) in line x 4 laps R/L    Functional #3 Fine motor manipulation/writing/buttoning/opening small containers -  Pulling out colored pegs from the peg board with the gripper, using UD and supination to pull x ~25 ea R/L  Velcro board large cylinder rolling up and down in \"can opener\" motion x 5 up/down  Velcro board key turn up and down board x 2 laps ea R/L  Y flexbar N bends x 10 (slow)  Y flexbar U bands x 10 (slow) Velcro cube finger extension x3 each finger     SLS balance airex, shoulder abduction with digi-flex each individual digit in sequence in arc OH x 10 R/L  Y putty tasks to address stirring    Flicks and big scooping to be used at home    Big stirring with 4# hand weight x 15 R/L  -   Functional #4 Balance on 1 leg to don/doff LE SLS bosu flat side  up runner step ups with BUE OH reach, 3 sec hold x 10 R/L   SLS hip hinge tap to chair without putting foot down x 5     SLS fwd hop onto airex, holding x 3 sec x 8 R/L SLS catch 2# ball with squat to tap cones (3) on 6\" box x 3 sets R/L   SLS 2# ball catch x 3 ea set, not putting LE down x 3 sets R/L  SLS on airex 4# ball throw to rebounder x10 R/L    SLS RDL 6# to cone x 10 R/L   SLS alt AL reach to ski poles, attempting to tap bottom line, multiple bouts  Single leg fwd hops onto airex reciprocal x 3 airex, landing on opp leg x 6       BIG Walking  BIG walking with dual cog task naming through the alphabet with patient also recalling place in alphabet (animals) BIG walking with dual cog task (food/places) BIG walking around 115ft loop with calling out variety of gait tasks, attempting to keep up speed x 3 laps  BIG walking keeping up speed around 115ft lap with hurdles (6) and airex (3) x 2 towards R x 2 towards L  Fwd/retro BIG walking with EC, 50ft x 2 ea Fwd around the 115ft [place] into retro around [place], into lateral half of ea [place] x 2 sets    -  Big walking with search and fond x 7 cones in clinic, not stopping until found all, minimal hints given as needed Walking outside on grass:  - up and down hill x 4  - big walking square up and down hill x 3  - big walking with R/L HT, slef-selected by patient, 50ft x 2  - big walking with up/down HT, self-selected by patient, 50ft x 2  - big 5 steps fwd, 3 steps back over ~75ft    Hierarchy Task pickleball BIG retro walking with multi-direct catch, 50ft ea way x 2 laps   Fwd jogging with tap to dot as called at random, retro jog return x 8; added calling out pickleball shots at random when at the dot x 8 Rebounder *2# MB fwd/bwd target jogging with throwing ball  *2# MB lat side shuffle with ball throw at rebounder  Weighted cable chopping, 20# x 10 R/L    EC on airex training, various positions and lengths of hold romberg to tandem      DL fwd/retro  and R/L  hops on mini trampoline x ~20 ea Weighted cable resisted retro walkout with bar, 50# total x 5; with rowing motion fwd and retro x 8  Weighted cable resisted fwd push with bar, 50# total x 8 Weighted cable up chopping 20# 10x R/L    Box jumps in // bars up and down from 4\", 6\", 6\", down into held squat x 3 laps  Supine SB walkout to bridge with alt LE extension x 6    Prone walkout to bridge x 5 Weighted cable bar retro walkout with rowing each step, 50# total x 10    Squats on bosu flat side up with 4# med ball reach OH x 10        Bridge c SB x 15  Abdominal iso with SB all 4s x 10  Abdominal iso with SB diagonal squeeze x 10  Dead bug c SB x 10  LTR c SB x 10  DKTC c SB x 10 - -  Scifit 3'F3'B - -     Passive stretching B LE by PT (HS, quad, ITB, piriformis, adductors, gastroc) Passive stretching B LE by PT (HS, quad, ITB, piriformis, adductors, gastroc) Passive stretching B LE by PT (HS, quad, ITB, piriformis, adductors, gastroc) - Passive stretching B LE by PT (HS, quad, ITB, piriformis, adductors, gastroc) Passive stretching B LE by PT (HS, quad, ITB, piriformis, adductors, gastroc) - -   Daily Carryover: big signing name when writing    Charges: NR x 2 (30') Therex x 1 (10'), Gait x 1 (15') Total Timed Treatment: 55 min  Total Treatment Time: 55 min

## 2025-03-04 ENCOUNTER — OFFICE VISIT (OUTPATIENT)
Dept: PHYSICAL THERAPY | Facility: HOSPITAL | Age: 65
End: 2025-03-04
Payer: COMMERCIAL

## 2025-03-04 PROCEDURE — 97116 GAIT TRAINING THERAPY: CPT

## 2025-03-04 PROCEDURE — 97112 NEUROMUSCULAR REEDUCATION: CPT

## 2025-03-04 NOTE — PROGRESS NOTES
Dx: Parkinson's         Authorized # of Visits:  16         Next MD visit: none scheduled  Fall Risk: standard         Precautions: n/a             Subjective: Patient states doing well, she had a busy weekend. Has decided to keep doing boxing classes 1x/week as they are challenging for her. She has some concern about D/C from PT at the end of this week, but knows that she can keep working on the maximal daily exercises IND without issue.     Objective:   Sensation: light touch intact, reports intermittent tingling R rays 3-5.   Coordination:    Alt Hand Flip: impaired ability to perform full supination and perform at fast speed maintaining appropriate pattern   Alt Finger to Nose (own nose): WNL   Nose to Moving PT Finger: min impaired accuracy R   Alt Toe Tap: WNL   KURT Toe Tap: WNL    Flexibility: Tightness in L > R HS, piriformis, and ITB    Tone: WNL  UE and LE AROM: Grossly WNL  LE MMT: Grossly WNL; 5/5 throughout BLEs   strength: R (dominant) 54.6#; L 49.6#  Pinch: R 14 pounds, L 13 pounds    Mobility / Transfers Level of Assistance   Sit --> Supine IND   Bed Rolling IND   Supine --> Sit IND   Sit --> Stand IND   Stand --> Sit IND   Chair --> Chair IND     Postural Control:   Romberg on level EO: >30 sec; level EC: >30 sec   Romberg on compliant EO: >30 sec; compliant EC: 30sec   Tandem Stance R back: 30 sec, L back: 30 sec  SLS: R 30 sec, L 30 sec    Biodex: postural stability: 2.7 overall stability: A/P stability: 1.6, M/L stability 2.1  SLS stability testing: R LE overall stability 2.1, A/P 1.2, M/L 1.5                                     L LE overall stability 3.6, A/P 2.1, M/L 2.6    ADL Assessment:   9 Hole Peg Test: R (dominant) 18.85 sec; L 17.30 sec     Functional Mobility:  30 sec sit<>stand: 21x Fall Risk: no  Gait: pt ambulates on level ground with min fwd bent posturing, dec UE swing KURT; TM: 3pmh R step length 76cm, L 71cm; % of time on R 51%, L 49%  Timed \"Up and Go\": 5.44 sec  TUG Carry  (10# crate): 5.39 sec  TUG Cognitive (count backwards from 100 by 7s): 5.98sec   Dual Task Cost: 0%  FGA Score: 30/30;   HiMAT: 40/54  5 MWT: 580Meters      Assessment: Progression BIG walking dual cog task to performing with serial listing; patient able to perform fwd big walking with telling all steps of making a recipe she knows well in correct order with all details. With fwd walking, was able to maintain speed and step length with dual cog. When performed in retro, patient when tendency to increase gait speed with decreased step length, occasional evidence of festination, diminished awareness of surroundings. When cued for toe first contact in retro, was able to improve and generally maintain; definitively maintained with all attention to that task.       Goals: (To be met in 16 visits)  Patient will demonstrate improved ability to interpret vestibular cues by performing tandem on airex with EC x 10 sec. - progress  Patient will demonstrate ability to perform lateral shuffling and fwd/retro jogging without freezing or slowed speed. - progress  Patient will demonstrate improved fine motor coordination by improving R hand score on 9 Hole Peg Test by 5 sec or better. - Met  Patient will improve score on HiMAT MCID 3 pts or better to improve ability to perform high level activity like pickleball. - progress  Patient will be IND and compliant in HEP to maintain progress made in PT. - issued and progressed      Plan: Continue PT with progression as indicated, begin preparing for D/C at end of this week.      Program Date: 2/19/25  Tx: 6/16 Date: 2/20/25  Tx: 7/16 Date: 2/21/25  Tx: 8/16 Date: 2/25/25  Tx: 9/16 Date: 2/26/25  Tx: 10/16 Date: 2/27/25  Tx: 11/16 Date: 2/28/25  Tx: 12/16 Date: 3/4/25  Tx: 13/16   Sustained  Floor to Ceiling  Seated on SB with 2# weights x 5 Seated on SB with 2# weights x 5 Seated on SB with 2# weights x 5 Seated on SB with 2# weights x 5 Seated on SB with 2# weights x 5 Seated on SB with  2# weights x 5 Seated on SB with 2# weights x 5 Seated on SB with 3# weights x 5   Sustained  Side to Side Seated  on SB c 2# weights x 5 Seated  on SB with 2# weights x 5 Seated on SB with 2# weights x 5 R/L Seated on SB with 2# weights x 5 R/L Seated on SB with 2# weights x 5 R/L Seated on SB with 2# weights x 5 R/L Seated on SB with 2# weights x 5 R/L Seated on SB with 3# weights x 5 R/L   Repetitive Forward Step  Onto sand dune with 2# weights x 10 R/L Onto sand dune with 2# weights x 10 R/L Onto sand dune with 2# weights x 10 R/L On airex stepping onto bosu with 2# weights x 10 R/L On airex stepping onto bosu with 2# weights x 10 R/L On airex stepping onto bosu with 2# weights x 10 R/L On airex stepping onto bosu with 2# weights x 10 R/L On airex stepping onto bosu with 3# weights x 10 R/L   Repetitive Sideways Step Onto sand dune with 2# weights x 10 R/L Onto sand dune with 2# weights x 10 R/L Onto sand dune with 2# weights x 10 R/L On airex stepping onto bosu with 2# weights x 10 R/L On airex stepping onto bosu with 2# weights x 10 R/L On airex stepping onto bosu with 2# weights x 10 R/L On airex stepping onto bosu with 2# weights x 10 R/L On airex stepping onto bosu with 3# weights x 10 R/L   Repetitive Backward Step Off sand dune with 2# weights x 10 R/L Off sand dune with 2# weights x 10 R/L Off sand dune with 2# weights x 10 R/L Off bosu with 2# weights x 10 R/L Off bosu with 2# weights x 10 R/L Off bosu with 2# weights x 10 R/L Off bosu with 2# weights x 10 R/L Off bosu with 3# weights x 10 R/L   Repetitive Forward Rock and Reach On 2 airex pads x 10 R/L Tandem on 1 airex beam x 10 R/L back  Tandem on 1 airex beam x 10 R/L back  Tandem on 1 airex beam with 2# x 10 R/L back  Tandem on 1 airex beam with 2# x 10 R/L back  Tandem on 1 airex beam with 2# x 10 R/L back  Tandem on 1 airex beam with 2# x 10 R/L back  Tandem on 1 airex beam with 3# x 10 R/L back    Repetitive Sideways Rock and Reach On sand dune  x 10 R/L On sand dune x 10 R/L On airex beam with 2# weights x 10 R/L  Tandem on 1 airex beam with 2# x 10 R/L back  Tandem on 1 airex beam with 2# x 10 R/L back  Tandem on 1 airex beam with 2# x 10 R/L back  Tandem on 1 airex beam with 2# x 10 R/L back  Tandem on 1 airex beam with 3# x 10 R/L back    Functional #1 Sit to Stand BIG Off SB c 2# weights x 10 On sand dune with 2# hand weights x 10 On sand dune with 6# med ball OH lift x 10 On upside down BOSU x10 On upside down BOSU x10 On upside down BOSU x10 On upside down bosu with 4# med ball catch x 12 On upside down bosu with 4# med ball catch x 12   Functional #2 Jogging/lateral shuffle Weighted cable resisted 30#  *fwd/bwd walking x 5   *Lat R/L walking x 5  *lunge onto bosu ball x 10 each  *lat lunge onto bosu ball x 10 each  *step up onto bosu ball x 10 each  *squat on bosu ball x 10     Running at an obstacle, PT calls out which way to break at last second x ~5 R/L    High knee jogging on mini trampoline 2 x 30 sec - Running fwd/bwd between hurdles x2    Bigger steps fwd/bwd between hurdles x2    Lateral shuffle between hurdles x2  REASSESS Ladder drills variety of tasks including DLS, SLS, challenge to coordination and jumping/hopping x 10 min SLS 3-way dot tap, multiple bouts on R and L    Bosu flat side up SLS shifting fwd/retro x 10 R/L     Single leg lateral hops onto airex (3) in line x 4 laps R/L  Lateral shuffles to cones with up/over bosu in middle x 6 laps     Tandem diagonals, following with head and eyes, multiple bouts, easier R back    Tandem walking with R/L trunk twists with tracking x 10 steps     Functional #3 Fine motor manipulation/writing/buttoning/opening small containers  Pulling out colored pegs from the peg board with the gripper, using UD and supination to pull x ~25 ea R/L  Velcro board large cylinder rolling up and down in \"can opener\" motion x 5 up/down  Velcro board key turn up and down board x 2 laps ea R/L  Y flexbar N bends x  10 (slow)  Y flexbar U bands x 10 (slow) Velcro cube finger extension x3 each finger     SLS balance airex, shoulder abduction with digi-flex each individual digit in sequence in arc OH x 10 R/L  Y putty tasks to address stirring    Flicks and big scooping to be used at home    Big stirring with 4# hand weight x 15 R/L  - Seated on SB rows, 40# total x 12; 50# total x 12   Functional #4 Balance on 1 leg to don/doff LE  SLS hip hinge tap to chair without putting foot down x 5     SLS fwd hop onto airex, holding x 3 sec x 8 R/L SLS catch 2# ball with squat to tap cones (3) on 6\" box x 3 sets R/L   SLS 2# ball catch x 3 ea set, not putting LE down x 3 sets R/L  SLS on airex 4# ball throw to rebounder x10 R/L    SLS RDL 6# to cone x 10 R/L   SLS alt AL reach to ski poles, attempting to tap bottom line, multiple bouts  Single leg fwd hops onto airex reciprocal x 3 airex, landing on opp leg x 6     Big runner step up onto flat bosu down into SLS tap to floor, back into step up without touching floor, multiple attempts R/L, hard    BIG Walking  BIG walking with dual cog task (food/places) BIG walking around 115ft loop with calling out variety of gait tasks, attempting to keep up speed x 3 laps  BIG walking keeping up speed around 115ft lap with hurdles (6) and airex (3) x 2 towards R x 2 towards L  Fwd/retro BIG walking with EC, 50ft x 2 ea Fwd around the 115ft [place] into retro around [place], into lateral half of ea [place] x 2 sets    -  Big walking with search and fond x 7 cones in clinic, not stopping until found all, minimal hints given as needed Walking outside on grass:  - up and down hill x 4  - big walking square up and down hill x 3  - big walking with R/L HT, slef-selected by patient, 50ft x 2  - big walking with up/down HT, self-selected by patient, 50ft x 2  - big 5 steps fwd, 3 steps back over ~75ft  Big fwd walking with serial naming in tasks; talking through all steps of making a recipe with all details x 1,  repeated retro    Hierarchy Task pickleball Rebounder *2# MB fwd/bwd target jogging with throwing ball  *2# MB lat side shuffle with ball throw at rebounder  Weighted cable chopping, 20# x 10 R/L    EC on airex training, various positions and lengths of hold romberg to tandem      DL fwd/retro  and R/L hops on mini trampoline x ~20 ea Weighted cable resisted retro walkout with bar, 50# total x 5; with rowing motion fwd and retro x 8  Weighted cable resisted fwd push with bar, 50# total x 8 Weighted cable up chopping 20# 10x R/L    Box jumps in // bars up and down from 4\", 6\", 6\", down into held squat x 3 laps  Supine SB walkout to bridge with alt LE extension x 6    Prone walkout to bridge x 5 Weighted cable bar retro walkout with rowing each step, 50# total x 10    Squats on bosu flat side up with 4# med ball reach OH x 10   Fwd jogging to p/u beanbags on cones with retro return to drop in basket, ladder formation x 5; repeated with return as side shuffles switching btwn R and L x 1 set     Bridge c SB x 15  Abdominal iso with SB all 4s x 10  Abdominal iso with SB diagonal squeeze x 10  Dead bug c SB x 10  LTR c SB x 10  DKTC c SB x 10 - -  Scifit 3'F3'B - - AP rockerboard end limits of stability, beginning EO, progressing to EC x ~20     Passive stretching B LE by PT (HS, quad, ITB, piriformis, adductors, gastroc) Passive stretching B LE by PT (HS, quad, ITB, piriformis, adductors, gastroc) - Passive stretching B LE by PT (HS, quad, ITB, piriformis, adductors, gastroc) Passive stretching B LE by PT (HS, quad, ITB, piriformis, adductors, gastroc) - - -   Daily Carryover: big signing name when writing    Charges: NR x 3 (40'), Gait x 1 (15') Total Timed Treatment: 55 min  Total Treatment Time: 55 min

## 2025-03-05 ENCOUNTER — TELEPHONE (OUTPATIENT)
Dept: PHYSICAL THERAPY | Facility: HOSPITAL | Age: 65
End: 2025-03-05

## 2025-03-05 ENCOUNTER — APPOINTMENT (OUTPATIENT)
Dept: PHYSICAL THERAPY | Facility: HOSPITAL | Age: 65
End: 2025-03-05
Payer: COMMERCIAL

## 2025-03-06 ENCOUNTER — OFFICE VISIT (OUTPATIENT)
Dept: PHYSICAL THERAPY | Facility: HOSPITAL | Age: 65
End: 2025-03-06
Payer: COMMERCIAL

## 2025-03-06 PROCEDURE — 97112 NEUROMUSCULAR REEDUCATION: CPT

## 2025-03-06 PROCEDURE — 97116 GAIT TRAINING THERAPY: CPT

## 2025-03-06 NOTE — PROGRESS NOTES
Dx: Parkinson's         Authorized # of Visits:  16         Next MD visit: none scheduled  Fall Risk: standard         Precautions: n/a             Subjective: Patient states doing well, had cycling class in the morning and does class 4x/week which she feels is challenging for her. Patient okay with D/C from PT end of this week and feels confident in continuing her exercises on her own.     Objective:   Sensation: light touch intact, reports intermittent tingling R rays 3-5.   Coordination:    Alt Hand Flip: impaired ability to perform full supination and perform at fast speed maintaining appropriate pattern   Alt Finger to Nose (own nose): WNL   Nose to Moving PT Finger: min impaired accuracy R   Alt Toe Tap: WNL   KURT Toe Tap: WNL    Flexibility: Tightness in L > R HS, piriformis, and ITB    Tone: WNL  UE and LE AROM: Grossly WNL  LE MMT: Grossly WNL; 5/5 throughout BLEs   strength: R (dominant) 54.6#; L 49.6#  Pinch: R 14 pounds, L 13 pounds    Mobility / Transfers Level of Assistance   Sit --> Supine IND   Bed Rolling IND   Supine --> Sit IND   Sit --> Stand IND   Stand --> Sit IND   Chair --> Chair IND     Postural Control:   Romberg on level EO: >30 sec; level EC: >30 sec   Romberg on compliant EO: >30 sec; compliant EC: 30sec   Tandem Stance R back: 30 sec, L back: 30 sec  SLS: R 30 sec, L 30 sec    Biodex: postural stability: 2.7 overall stability: A/P stability: 1.6, M/L stability 2.1  SLS stability testing: R LE overall stability 2.1, A/P 1.2, M/L 1.5                                     L LE overall stability 3.6, A/P 2.1, M/L 2.6    ADL Assessment:   9 Hole Peg Test: R (dominant) 18.85 sec; L 17.30 sec     Functional Mobility:  30 sec sit<>stand: 21x Fall Risk: no  Gait: pt ambulates on level ground with min fwd bent posturing, dec UE swing KURT; TM: 3pmh R step length 76cm, L 71cm; % of time on R 51%, L 49%  Timed \"Up and Go\": 5.44 sec  TUG Carry (10# crate): 5.39 sec  TUG Cognitive (count backwards  from 100 by 7s): 5.98sec   Dual Task Cost: 0%  FGA Score: 30/30;   HiMAT: 40/54  5 MWT: 580Meters      Assessment: Patient performed fwd and bkwd stepping through ladder well and was able to keep weight shifted forward and weight on toes for bwd stepping with less festination than last visit. [place] get up deemed challenging for patient today, however with PT demo together, patient was able to follow steps to complete. Lateral shuffling and turns patient completed well, needed multiple steps to complete turn rather than pivot but completed with no LOB.       Goals: (To be met in 16 visits)  Patient will demonstrate improved ability to interpret vestibular cues by performing tandem on airex with EC x 10 sec. - progress  Patient will demonstrate ability to perform lateral shuffling and fwd/retro jogging without freezing or slowed speed. - progress  Patient will demonstrate improved fine motor coordination by improving R hand score on 9 Hole Peg Test by 5 sec or better. - Met  Patient will improve score on HiMAT MCID 3 pts or better to improve ability to perform high level activity like pickleball. - progress  Patient will be IND and compliant in HEP to maintain progress made in PT. - issued and progressed      Plan: Prepare for DC at next visit       Program Date: 2/20/25  Tx: 7/16 Date: 2/21/25  Tx: 8/16 Date: 2/25/25  Tx: 9/16 Date: 2/26/25  Tx: 10/16 Date: 2/27/25  Tx: 11/16 Date: 2/28/25  Tx: 12/16 Date: 3/4/25  Tx: 13/16 Date: 3/6/25  Tx: 14/16   Sustained  Floor to Ceiling  Seated on SB with 2# weights x 5 Seated on SB with 2# weights x 5 Seated on SB with 2# weights x 5 Seated on SB with 2# weights x 5 Seated on SB with 2# weights x 5 Seated on SB with 2# weights x 5 Seated on SB with 3# weights x 5 Seated on SB with 3# weights x5    Sustained  Side to Side Seated  on SB with 2# weights x 5 Seated on SB with 2# weights x 5 R/L Seated on SB with 2# weights x 5 R/L Seated on SB with 2# weights x 5 R/L Seated on  SB with 2# weights x 5 R/L Seated on SB with 2# weights x 5 R/L Seated on SB with 3# weights x 5 R/L Seated on SB with 3# weights x 5 R/L   Repetitive Forward Step  Onto sand dune with 2# weights x 10 R/L Onto sand dune with 2# weights x 10 R/L On airex stepping onto bosu with 2# weights x 10 R/L On airex stepping onto bosu with 2# weights x 10 R/L On airex stepping onto bosu with 2# weights x 10 R/L On airex stepping onto bosu with 2# weights x 10 R/L On airex stepping onto bosu with 3# weights x 10 R/L On airex stepping onto bosu with 3# weights x 10 R/L   Repetitive Sideways Step Onto sand dune with 2# weights x 10 R/L Onto sand dune with 2# weights x 10 R/L On airex stepping onto bosu with 2# weights x 10 R/L On airex stepping onto bosu with 2# weights x 10 R/L On airex stepping onto bosu with 2# weights x 10 R/L On airex stepping onto bosu with 2# weights x 10 R/L On airex stepping onto bosu with 3# weights x 10 R/L On airex stepping onto bosu with 3# weights x 10 R/L   Repetitive Backward Step Off sand dune with 2# weights x 10 R/L Off sand dune with 2# weights x 10 R/L Off bosu with 2# weights x 10 R/L Off bosu with 2# weights x 10 R/L Off bosu with 2# weights x 10 R/L Off bosu with 2# weights x 10 R/L Off bosu with 3# weights x 10 R/L Off bosu onto airex with 3# weights x 10 R/L   Repetitive Forward Rock and Reach Tandem on 1 airex beam x 10 R/L back  Tandem on 1 airex beam x 10 R/L back  Tandem on 1 airex beam with 2# x 10 R/L back  Tandem on 1 airex beam with 2# x 10 R/L back  Tandem on 1 airex beam with 2# x 10 R/L back  Tandem on 1 airex beam with 2# x 10 R/L back  Tandem on 1 airex beam with 3# x 10 R/L back  Tandem on 1 airex beam with 3# x 10 R/L back    Repetitive Sideways Rock and Reach On sand dune x 10 R/L On airex beam with 2# weights x 10 R/L  Tandem on 1 airex beam with 2# x 10 R/L back  Tandem on 1 airex beam with 2# x 10 R/L back  Tandem on 1 airex beam with 2# x 10 R/L back  Tandem on 1  airex beam with 2# x 10 R/L back  Tandem on 1 airex beam with 3# x 10 R/L back  Tandem on 1 airex beam with 3# x 10 R/L back    Functional #1 Sit to Stand BIG On sand dune with 2# hand weights x 10 On sand dune with 6# med ball OH lift x 10 On upside down BOSU x10 On upside down BOSU x10 On upside down BOSU x10 On upside down bosu with 4# med ball catch x 12 On upside down bosu with 4# med ball catch x 12 On upside down bosu with 3# x10   Functional #2 Jogging/lateral shuffle Running at an obstacle, PT calls out which way to break at last second x ~5 R/L    High knee jogging on mini trampoline 2 x 30 sec - Running fwd/bwd between hurdles x2    Bigger steps fwd/bwd between hurdles x2    Lateral shuffle between hurdles x2  REASSESS Ladder drills variety of tasks including DLS, SLS, challenge to coordination and jumping/hopping x 10 min SLS 3-way dot tap, multiple bouts on R and L    Bosu flat side up SLS shifting fwd/retro x 10 R/L     Single leg lateral hops onto airex (3) in line x 4 laps R/L  Lateral shuffles to cones with up/over bosu in middle x 6 laps     Tandem diagonals, following with head and eyes, multiple bouts, easier R back    Tandem walking with R/L trunk twists with tracking x 10 steps     - Ladder drills variety of tasks including DLS, SLS, challenge to coordination and jumping/hopping x 10 min    - [place] get up, started with 5#, down to 3# x10    Functional #3 Fine motor manipulation/writing/buttoning/opening small containers Pulling out colored pegs from the peg board with the gripper, using UD and supination to pull x ~25 ea R/L  Velcro board large cylinder rolling up and down in \"can opener\" motion x 5 up/down  Velcro board key turn up and down board x 2 laps ea R/L  Y flexbar N bends x 10 (slow)  Y flexbar U bands x 10 (slow) Velcro cube finger extension x3 each finger     SLS balance airex, shoulder abduction with digi-flex each individual digit in sequence in arc OH x 10 R/L  Y putty tasks to  address stirring    Flicks and big scooping to be used at home    Big stirring with 4# hand weight x 15 R/L  - Seated on SB rows, 40# total x 12; 50# total x 12 -   Functional #4 Balance on 1 leg to don/doff LE SLS hip hinge tap to chair without putting foot down x 5     SLS fwd hop onto airex, holding x 3 sec x 8 R/L SLS catch 2# ball with squat to tap cones (3) on 6\" box x 3 sets R/L   SLS 2# ball catch x 3 ea set, not putting LE down x 3 sets R/L  SLS on airex 4# ball throw to rebounder x10 R/L    SLS RDL 6# to cone x 10 R/L   SLS alt AL reach to ski poles, attempting to tap bottom line, multiple bouts  Single leg fwd hops onto airex reciprocal x 3 airex, landing on opp leg x 6     Big runner step up onto flat bosu down into SLS tap to floor, back into step up without touching floor, multiple attempts R/L, hard  - SL RDL on airex 2x10   - SL fwd/back/curtsy squat, foot on slider,    BIG Walking  BIG walking around 115ft loop with calling out variety of gait tasks, attempting to keep up speed x 3 laps  BIG walking keeping up speed around 115ft lap with hurdles (6) and airex (3) x 2 towards R x 2 towards L  Fwd/retro BIG walking with EC, 50ft x 2 ea Fwd around the 115ft [place] into retro around [place], into lateral half of ea [place] x 2 sets    -  Big walking with search and fond x 7 cones in clinic, not stopping until found all, minimal hints given as needed Walking outside on grass:  - up and down hill x 4  - big walking square up and down hill x 3  - big walking with R/L HT, slef-selected by patient, 50ft x 2  - big walking with up/down HT, self-selected by patient, 50ft x 2  - big 5 steps fwd, 3 steps back over ~75ft  Big fwd walking with serial naming in tasks; talking through all steps of making a recipe with all details x 1, repeated retro  Big walking around 200ft loop with PT calling turn and position changes (2)   Hierarchy Task pickleball Weighted cable chopping, 20# x 10 R/L    EC on airex training,  various positions and lengths of hold romberg to tandem      DL fwd/retro  and R/L hops on mini trampoline x ~20 ea Weighted cable resisted retro walkout with bar, 50# total x 5; with rowing motion fwd and retro x 8  Weighted cable resisted fwd push with bar, 50# total x 8 Weighted cable up chopping 20# 10x R/L    Box jumps in // bars up and down from 4\", 6\", 6\", down into held squat x 3 laps  Supine SB walkout to bridge with alt LE extension x 6    Prone walkout to bridge x 5 Weighted cable bar retro walkout with rowing each step, 50# total x 10    Squats on bosu flat side up with 4# med ball reach OH x 10   Fwd jogging to p/u beanbags on cones with retro return to drop in basket, ladder formation x 5; repeated with return as side shuffles switching btwn R and L x 1 set - Lateral shuffles to cones with pickleball shot and turn x4      - -  Scifit 3'F3'B - - AP rockerboard end limits of stability, beginning EO, progressing to EC x ~20      Passive stretching B LE by PT (HS, quad, ITB, piriformis, adductors, gastroc) - Passive stretching B LE by PT (HS, quad, ITB, piriformis, adductors, gastroc) Passive stretching B LE by PT (HS, quad, ITB, piriformis, adductors, gastroc) - - - Passive stretching B LE by PT (HS, quad, ITB, piriformis, adductors, gastroc)   Daily Carryover: big signing name when writing    Charges: NR x 3 (45'), Gait x 1 (10') Total Timed Treatment: 55 min  Total Treatment Time: 60 min     This treatment was provided by MISTI Hayes under the direct and constant direction and supervision of a licensed therapist, who provided consultation regarding skilled judgements, treatment, and assessment of patient care.

## 2025-03-07 ENCOUNTER — OFFICE VISIT (OUTPATIENT)
Dept: PHYSICAL THERAPY | Facility: HOSPITAL | Age: 65
End: 2025-03-07
Payer: COMMERCIAL

## 2025-03-07 PROCEDURE — 97110 THERAPEUTIC EXERCISES: CPT

## 2025-03-07 PROCEDURE — 97112 NEUROMUSCULAR REEDUCATION: CPT

## 2025-03-07 NOTE — PROGRESS NOTES
Discharge Summary  Pt has attended 15 visits in Physical Therapy.     Dx: Parkinson's         Authorized # of Visits:  [id number]         Next MD visit: none scheduled  Fall Risk: standard         Precautions: n/a             Subjective: Patient states ready and willing to D/C from POC/LSVT BIG program this date, has no current concern regarding her ability to continue IND with maximal daily exercises. States feels that PT has been very helpful to her in that has given her confidence in her ability to continue to improve and delay decline associated with PD diagnosis. She has an IND home program in place that is appropriate for her current level, including community based activities such as boxing and cycling classes.     Objective:     (3/7/25):  Functional Mobility:  6MWT: 2057ft without rest break, mild inc WOB, RPE 3/10  9 Hole Peg Test: R (dominant): 17 sec; L 15.2 sec  High level tasks performed over prior sessions as listed including, but not limited to running all directions, SLS tasks on compliant surfaces, quick change of direction  HiMAT: opted not to perform for 3rd time due to appropriate performance last assessment     Balance:   SLS: >30 sec R/L   Tandem on airex with EC: >15 sec R/L back, inc sway R back    Strength:  5/5 throughout BLEs  : R 64.6#; L 58.8#      Assessment: Patient has made great progress with skilled PT intervention since SOC. At this time, she has met and surpassed all goals, as well as making improvements in all other tested objective measures at visit 10 and/or this date as compared to baseline. This is significant for patient as she did not demonstrate significant impairments at baseline, but was still able to further improve. Greatest level of improvement is highly important for this patient and this population in order to slow the decline of neurodegenerative PD for as long as possible. This suggests that patient is highly responsive to exercise to reverse and delay detrimental  symptoms allowing her to stay IND, living in her own home, without need for other care. At this time, she is not concerned at risk for falls based on any postural control or dynamic gait measures. Was able to complete HiMAT without issue. Demonstrates >10# improvement in R and L  strength from eval, 9 Hole Peg Test also improved beyond expected average for age. Patient is motivated and IND in performance of maximal daily exercises. She is already participating in community based exercise classes and home exercise program in addition to these exercises, has been educated in other community based PD programs as well. Feel that she will continue to do very well, but educated to return as appropriate, especially with any decline. Patient to D/C from PT per plan as listed below.      Goals: (To be met in 16 visits)  Patient will demonstrate improved ability to interpret vestibular cues by performing tandem on airex with EC x 10 sec. - MET  Patient will demonstrate ability to perform lateral shuffling and fwd/retro jogging without freezing or slowed speed. - MET  Patient will demonstrate improved fine motor coordination by improving R hand score on 9 Hole Peg Test by 5 sec or better. - MET  Patient will improve score on HiMAT MCID 3 pts or better to improve ability to perform high level activity like pickleball. - MET  Patient will be IND and compliant in HEP to maintain progress made in PT. -  MET        Plan: Patient to D/C from PT with continued IND HEP due to goals met, completion of LSVT BIG program. Should return in future as needed due to progressive nature of diagnosis.     Patient/Family/Caregiver was advised of these findings, precautions, and treatment options and has agreed to actively participate in planning and for this course of care.    Thank you for your referral. If you have any questions, please contact me at Dept: [phone].    Sincerely,  Electronically signed by therapist: Farzana Robledo, PT,  DPT    Physician's certification required:  No    Certification From: 3/7/2025  To:6/5/2025        Program Date: 2/21/25  Tx: 8/16 Date: 2/25/25  Tx: 9/16 Date: 2/26/25  Tx: 10/16 Date: 2/27/25  Tx: 11/16 Date: 2/28/25  Tx: 12/16 Date: 3/4/25  Tx: 13/16 Date: 3/6/25  Tx: 14/16 Date: 3/7/25  Tx: 15/16   Sustained  Floor to Ceiling  Seated on SB with 2# weights x 5 Seated on SB with 2# weights x 5 Seated on SB with 2# weights x 5 Seated on SB with 2# weights x 5 Seated on SB with 2# weights x 5 Seated on SB with 3# weights x 5 Seated on SB with 3# weights x5  Seated on SB with 3# weights x5    Sustained  Side to Side Seated on SB with 2# weights x 5 R/L Seated on SB with 2# weights x 5 R/L Seated on SB with 2# weights x 5 R/L Seated on SB with 2# weights x 5 R/L Seated on SB with 2# weights x 5 R/L Seated on SB with 3# weights x 5 R/L Seated on SB with 3# weights x 5 R/L Seated on SB with 3# weights x 5 R/L   Repetitive Forward Step  Onto [place] with 2# weights x 10 R/L On airex stepping onto bosu with 2# weights x 10 R/L On airex stepping onto bosu with 2# weights x 10 R/L On airex stepping onto bosu with 2# weights x 10 R/L On airex stepping onto bosu with 2# weights x 10 R/L On airex stepping onto bosu with 3# weights x 10 R/L On airex stepping onto bosu with 3# weights x 10 R/L On airex stepping onto bosu with 3# weights x 10 R/L   Repetitive Sideways Step Onto [place] with 2# weights x 10 R/L On airex stepping onto bosu with 2# weights x 10 R/L On airex stepping onto bosu with 2# weights x 10 R/L On airex stepping onto bosu with 2# weights x 10 R/L On airex stepping onto bosu with 2# weights x 10 R/L On airex stepping onto bosu with 3# weights x 10 R/L On airex stepping onto bosu with 3# weights x 10 R/L On airex stepping onto bosu with 3# weights x 10 R/L   Repetitive Backward Step Off [place] with 2# weights x 10 R/L Off bosu with 2# weights x 10 R/L Off bosu with 2# weights x 10 R/L Off bosu with 2#  weights x 10 R/L Off bosu with 2# weights x 10 R/L Off bosu with 3# weights x 10 R/L Off bosu onto airex with 3# weights x 10 R/L Off bosu onto airex with 3# weights x 10 R/L   Repetitive Forward Rock and Reach Tandem on 1 airex beam x 10 R/L back  Tandem on 1 airex beam with 2# x 10 R/L back  Tandem on 1 airex beam with 2# x 10 R/L back  Tandem on 1 airex beam with 2# x 10 R/L back  Tandem on 1 airex beam with 2# x 10 R/L back  Tandem on 1 airex beam with 3# x 10 R/L back  Tandem on 1 airex beam with 3# x 10 R/L back  andem on 1 airex beam with 3# x 10 R/L back    Repetitive Sideways Rock and Reach On airex beam with 2# weights x 10 R/L  Tandem on 1 airex beam with 2# x 10 R/L back  Tandem on 1 airex beam with 2# x 10 R/L back  Tandem on 1 airex beam with 2# x 10 R/L back  Tandem on 1 airex beam with 2# x 10 R/L back  Tandem on 1 airex beam with 3# x 10 R/L back  Tandem on 1 airex beam with 3# x 10 R/L back  Tandem on 1 airex beam with 3# x 10 R/L back    Functional #1 Sit to Stand BIG On sand dune with 6# med ball OH lift x 10 On upside down BOSU x10 On upside down BOSU x10 On upside down BOSU x10 On upside down bosu with 4# med ball catch x 12 On upside down bosu with 4# med ball catch x 12 On upside down bosu with 3# x10 On upside down bosu with 3# x10   Functional #2 Jogging/lateral shuffle - Running fwd/bwd between hurdles x2    Bigger steps fwd/bwd between hurdles x2    Lateral shuffle between hurdles x2  REASSESS Ladder drills variety of tasks including DLS, SLS, challenge to coordination and jumping/hopping x 10 min SLS 3-way dot tap, multiple bouts on R and L    Bosu flat side up SLS shifting fwd/retro x 10 R/L     Single leg lateral hops onto airex (3) in line x 4 laps R/L  Lateral shuffles to cones with up/over bosu in middle x 6 laps     Tandem diagonals, following with head and eyes, multiple bouts, easier R back    Tandem walking with R/L trunk twists with tracking x 10 steps     - Ladder drills  variety of tasks including DLS, SLS, challenge to coordination and jumping/hopping x 10 min    - [place] get up, started with 5#, down to 3# x10  -   Functional #3 Fine motor manipulation/writing/buttoning/opening small containers Velcro board large cylinder rolling up and down in \"can opener\" motion x 5 up/down  Velcro board key turn up and down board x 2 laps ea R/L  Y flexbar N bends x 10 (slow)  Y flexbar U bands x 10 (slow) Velcro cube finger extension x3 each finger     SLS balance airex, shoulder abduction with digi-flex each individual digit in sequence in arc OH x 10 R/L  Y putty tasks to address stirring    Flicks and big scooping to be used at home    Big stirring with 4# hand weight x 15 R/L  - Seated on SB rows, 40# total x 12; 50# total x 12 - -   Functional #4 Balance on 1 leg to don/doff LE SLS catch 2# ball with squat to tap cones (3) on 6\" box x 3 sets R/L   SLS 2# ball catch x 3 ea set, not putting LE down x 3 sets R/L  SLS on airex 4# ball throw to rebounder x10 R/L    SLS RDL 6# to cone x 10 R/L   SLS alt AL reach to ski poles, attempting to tap bottom line, multiple bouts  Single leg fwd hops onto airex reciprocal x 3 airex, landing on opp leg x 6     Big runner step up onto flat bosu down into SLS tap to floor, back into step up without touching floor, multiple attempts R/L, hard  - SL RDL on airex 2x10   - SL fwd/back/curtsy squat, foot on slider,  -   BIG Walking  Fwd around the 115ft [place] into retro around [place], into lateral half of ea [place] x 2 sets    -  Big walking with search and fond x 7 cones in clinic, not stopping until found all, minimal hints given as needed Walking outside on grass:  - up and down hill x 4  - big walking square up and down hill x 3  - big walking with R/L HT, slef-selected by patient, 50ft x 2  - big walking with up/down HT, self-selected by patient, 50ft x 2  - big 5 steps fwd, 3 steps back over ~75ft  Big fwd walking with serial naming in tasks; talking  through all steps of making a recipe with all details x 1, repeated retro  Big walking around 200ft loop with PT calling turn and position changes (2) Goals reassessment as needed and listed     6MWT   Hierarchy Task pickleball Weighted cable resisted retro walkout with bar, 50# total x 5; with rowing motion fwd and retro x 8  Weighted cable resisted fwd push with bar, 50# total x 8 Weighted cable up chopping 20# 10x R/L    Box jumps in // bars up and down from 4\", 6\", 6\", down into held squat x 3 laps  Supine SB walkout to bridge with alt LE extension x 6    Prone walkout to bridge x 5 Weighted cable bar retro walkout with rowing each step, 50# total x 10    Squats on bosu flat side up with 4# med ball reach OH x 10   Fwd jogging to p/u beanbags on cones with retro return to drop in basket, ladder formation x 5; repeated with return as side shuffles switching btwn R and L x 1 set - Lateral shuffles to cones with pickleball shot and turn x4  -     -  Scifit 3'F3'B - - AP rockerboard end limits of stability, beginning EO, progressing to EC x ~20  Discussion, edu clinical findings, POC, HEP, HEP progression, other options for community activities, appropriate return to PT over lifetime, etc.     - Passive stretching B LE by PT (HS, quad, ITB, piriformis, adductors, gastroc) Passive stretching B LE by PT (HS, quad, ITB, piriformis, adductors, gastroc) - - - Passive stretching B LE by PT (HS, quad, ITB, piriformis, adductors, gastroc) -   Daily Carryover: big signing name when writing    Charges: NR x 2 (25'), Therex x 2 (30')  Total Timed Treatment: 55 min  Total Treatment Time: 55 min

## 2025-04-07 ENCOUNTER — TELEPHONE (OUTPATIENT)
Dept: PHYSICAL THERAPY | Facility: HOSPITAL | Age: 65
End: 2025-04-07

## 2025-04-08 ENCOUNTER — OFFICE VISIT (OUTPATIENT)
Dept: SPEECH THERAPY | Age: 65
End: 2025-04-08
Attending: Other
Payer: COMMERCIAL

## 2025-04-08 ENCOUNTER — TELEPHONE (OUTPATIENT)
Dept: PHYSICAL THERAPY | Facility: HOSPITAL | Age: 65
End: 2025-04-08

## 2025-04-08 DIAGNOSIS — G20.A1 PARKINSON'S DISEASE WITHOUT DYSKINESIA OR FLUCTUATING MANIFESTATIONS (HCC): Primary | ICD-10-CM

## 2025-04-08 PROCEDURE — 92524 BEHAVRAL QUALIT ANALYS VOICE: CPT

## 2025-04-08 PROCEDURE — 92507 TX SP LANG VOICE COMM INDIV: CPT

## 2025-04-08 NOTE — PROGRESS NOTES
ADULT SLP EVALUATION:     Diagnosis:   Parkinson's disease without dyskinesia or fluctuating manifestations (HCC) (G20.A1) Patient:  Elvia Raymond (64 year old, female)        Referring Provider: Doretha Caceres  Today's Date: 2025    Precautions:   Fall Risk Date of Evaluation: 25  Next MD visit: NA     PATIENT SUMMARY   Summary of chief complaints: chronic throat clear and weakening vocal loudness   History of current condition: Patient was diagnosed with Parkinsons on 10/9/2024 but has been having symptoms for years and reports that the MD would dismiss her symptoms as something else. She just completed LSVT big progam on 3/7/2025 and is here today to evaluate for voice and chronic cough.   Pain level:  0 /10  Current limitations: feels like her voice has gotten weaker and has a chronic cough.  Pt goals: wanting to maintain or strengthen vocal loudness and not continue to decline     Hospital History: None     Past medical history was reviewed with Elvia.  Significant findings include: mild dysphonia and chronic cough  Elvia Raymond  has a past medical history of Hemorrhoids, Migraine, and Migraines ().  Current Outpatient Medications on File Prior to Visit: [] oseltamivir 75 MG Oral Cap, Take 1 capsule (75 mg total) by mouth 2 (two) times daily for 5 days., carbidopa-levodopa  MG Oral Tab, Take 1.5 tablets by mouth 3 (three) times daily., ALPRAZolam (XANAX) 0.25 MG Oral Tab, Take 1 tablet (0.25 mg total) by mouth 2 (two) times daily as needed.    ASSESSMENT  Elvia presents to speech therapy evaluation with primary c/o chronic throat clear and weakening vocal loudness. The results of the objective tests and measures show mild dysphonia and chronic cough.  Functional deficits include but are not limited to feels like her voice has gotten weaker and has a chronic cough.. Signs and symptoms are consistent with diagnosis of Parkinson's disease without dyskinesia or  fluctuating manifestations (HCC) (G20.A1). Pt and SLP discussed evaluation findings, pathology, POC and HEP.  Pt voiced understanding and performs HEP correctly. Skilled Speech Therapy is medically necessary to address the above impairments and reach functional goals.     OBJECTIVE:     LSVT  Acoustic Measures: Sound pressure level (SPL, acoustic correlate of vocal loudness) is measured with a sound level meter at approximately 50 cm from the patient's mouth during three voice and speech tasks.  Johns average vocal SPL measures are the following:    ACOUSTIC MEASURES   Vowels 75.6 dB   Reading 81.3 dB   Conversion 77.1 dB   High Pitch Average 234 Hz   Low Pitch Average 154 Hz     Perceptual Measures: Elvia's functional speech intelligibility is reduced 0 percent of the time during conversation and varies based on the type of speaking activity and environment.     Stimulability Testing: Stimulability testing was completed to determine Elvia's stimulability for the LSVT LOUD ([facility] Voice Treatment) program.  With stimulation to \"increase loudness,\"   Elvia increased her vocal loudness as commented below:  Vowel phonation: Patient had appropriate loudness during evaluation.  Reading of single words and short phrases: Patient had appropriate loudness during evaluation.    Recommendations: It is recommended that Elvia receive the LSVT LOUD program which is comprised of 16 intensive sessions (4 days a week for 4 weeks, one-hour sessions) of voice treatment. Prognosis for improvement is excellent based on her motivation, stimulability, and family support. LSVT LOUD has been documented in the literature as efficacious for individuals with Parkinson Disease.   VOICE ANALYSIS   Voice History and Hygiene   Current Voice Diagnosis: Parkinson's disease without dyskinesia or fluctuating manifestations (HCC) (G20.A1)  Date of Onset: 10/9/2024  Date of ENT Evaluation: none yet    Findings:      Daily  water intake: 33-56 oz  Caffeine intake (glasses/day):    Alcohol intake (glasses/day): 0  Smoking history: non-smoker    Current vocal demands: Parkinson's disease without dyskinesia or fluctuating manifestations (HCC) (G20.A1)  Vocal Abuses: social; occupational; talking on phone/computer  Triggers:         Today's Treatment and Response:   Pt education was provided on exam findings, treatment diagnosis, treatment plan, expectations, and prognosis.  Charges: EVAL x 1,  Total Treatment Time: 45 min                                                  PLAN OF CARE:    Goals: (to be met in 12 visits)   Patient will independently demonstrate how to \"speak with intent.\"      Current % Accuracy: 100%   2.   Patient will independently produce spontaneous conversation while using intent with an average of 72-78dB.       Current % Accuracy: 100%   3.   Patient will independently read sentences with intent with an average of 75-85dB.    Current % Accuracy: 100%   4.   Patient will accurately demonstrate the six SPEAK OUT! Exercises.      Current % Accuracy: 100%   5.  Patient will independently complete SPEAK OUT! home exercise program twice daily.      Current % Accuracy: 100%   6.   The patient will participate in SPEAK OUT! Group.       Current % Accuracy: 100%      Frequency / Duration: Patient will be seen 1-2x/weekx/week or a total of 12  visits over a 90 day period. Treatment will include: speech therapy - It is recommended that she participate in a program offered at our [facility] called SPEAK OUT!    Education or treatment limitation: Compliance   Rehab Potential: excellent    Patient/Family/Caregiver was advised of these findings, precautions, and treatment options and has agreed to actively participate in planning and for this course of care.    Thank you for your referral. Please co-sign or sign and return this letter via fax as soon as possible to [phone]. If you have any questions, please contact me  at Dept: [phone]    Sincerely,  Electronically signed by therapist: Paresh Mccullough MS, CCC-SLP/L  Licensed Speech-Language Pathologist    Physician's certification required: Yes  I certify the need for these services furnished under this plan of treatment and while under my care.    X___________________________________________________ Date____________________    Certification From: 4/8/2025  To:7/7/2025

## 2025-05-08 DIAGNOSIS — F41.9 ANXIETY: ICD-10-CM

## 2025-05-08 RX ORDER — ALPRAZOLAM 0.25 MG
0.25 TABLET ORAL 2 TIMES DAILY PRN
Qty: 60 TABLET | Refills: 0 | Status: SHIPPED | OUTPATIENT
Start: 2025-05-08

## 2025-06-11 ENCOUNTER — OFFICE VISIT (OUTPATIENT)
Dept: NEUROLOGY | Facility: CLINIC | Age: 65
End: 2025-06-11
Payer: COMMERCIAL

## 2025-06-11 VITALS
HEART RATE: 70 BPM | SYSTOLIC BLOOD PRESSURE: 122 MMHG | BODY MASS INDEX: 23 KG/M2 | WEIGHT: 132 LBS | DIASTOLIC BLOOD PRESSURE: 82 MMHG

## 2025-06-11 DIAGNOSIS — G20.A1 PARKINSON'S DISEASE WITHOUT DYSKINESIA OR FLUCTUATING MANIFESTATIONS (HCC): Primary | ICD-10-CM

## 2025-06-11 PROCEDURE — 3074F SYST BP LT 130 MM HG: CPT | Performed by: OTHER

## 2025-06-11 PROCEDURE — 99214 OFFICE O/P EST MOD 30 MIN: CPT | Performed by: OTHER

## 2025-06-11 PROCEDURE — 3079F DIAST BP 80-89 MM HG: CPT | Performed by: OTHER

## 2025-06-11 NOTE — PROGRESS NOTES
Patient reports she is doing well. She completed LSVT and complimented the program. Would like to try again before December

## 2025-06-11 NOTE — PATIENT INSTRUCTIONS
Refill policies:    Allow 2-3 business days for refills; controlled substances may take longer.  Contact your pharmacy at least 5 days prior to running out of medication and have them send an electronic request or submit request through the “request refill” option in your [employer] account.  Refills are not addressed on weekends; covering physicians do not authorize routine medications on weekends.  No narcotics or controlled substances are refilled after noon on Fridays or by on call physicians.  By law, narcotics must be electronically prescribed.  A 30 day supply with no refills is the maximum allowed.  If your prescription is due for a refill, you may be due for a follow up appointment.  To best provide you care, patients receiving routine medications need to be seen at least once a year.  Patients receiving narcotic/controlled substance medications need to be seen at least once every 3 months.  In the event that your preferred pharmacy does not have the requested medication in stock (e.g. Backordered), it is your responsibility to find another pharmacy that has the requested medication available.  We will gladly send a new prescription to that pharmacy at your request.    Scheduling Tests:    If your physician has ordered radiology tests such as MRI or CT scans, please contact Central Scheduling at [phone] right away to schedule the test.  Once scheduled, the [facility] Centralized Referral Team will work with your insurance carrier to obtain pre-certification or prior authorization.  Depending on your insurance carrier, approval may take 3-10 days.  It is highly recommended patients assure they have received an authorization before having a test performed.  If test is done without insurance authorization, patient may be responsible for the entire amount billed.      Precertification and Prior Authorizations:  If your physician has recommended that you have a procedure or additional testing performed the [facility]  Centralized Referral Team will contact your insurance carrier to obtain pre-certification or prior authorization.    You are strongly encouraged to contact your insurance carrier to verify that your procedure/test has been approved and is a COVERED benefit.  Although the [facility] Centralized Referral Team does its due diligence, the insurance carrier gives the disclaimer that \"Although the procedure is authorized, this does not guarantee payment.\"    Ultimately the patient is responsible for payment.   Thank you for your understanding in this matter.  Paperwork Completion:  If you require FMLA or disability paperwork for your recovery, please make sure to either drop it off or have it faxed to our office at [phone]. Be sure the form has your name and date of birth on it.  The form will be faxed to our Forms Department and they will complete it for you.  There is a 25$ fee for all forms that need to be filled out.  Please be aware there is a 10-14 day turnaround time.  You will need to sign a release of information (RICKY) form if your paperwork does not come with one.  You may call the Forms Department with any questions at [phone].  Their fax number is [phone].

## 2025-06-11 NOTE — PROGRESS NOTES
HPI:    Patient ID: Elvia Raymond is a 64 year old female.    HPI  She has retired since I last saw her, she returns today for routine follow-up of her Parkinson's disease.  She has been doing great as she told me, she is maintained on carbidopa levodopa immediate release, she takes 1.5 tablets 3 times daily, denied any side effects to the medication.  She attended [facility] big and loud program and had done exceptionally well with this program.  She was asking if she can attempt it again however she has been doing the exercises at home and her symptoms have been under very good control.   She recently went on a cruise back in January 2025 with her kids, she exercised every morning with her son, retiring from work has helped her focus more on her health.  Sleep has been okay, mood has improved.    HISTORY:  Past Medical History[1]   Past Surgical History[2]   Family History[3]   Short Social Hx on File[4]     Review of Systems    Negative except as in HPI   Current Medications[5]  Allergies:Allergies[6]  PHYSICAL EXAM:   Physical Exam    General Appearance: Well nourished, well developed, no apparent distress.     HEENT: Normocephalic and atraumatic. Normal sclera.      Mental Status Exam: Patient is awake, alert and oriented   Speech minimally hypophonic  Cranial Nerves:   II: Visual fields: normal to confrontation test  III: Pupils: equal, round, reactive to light, NO APD  III,IV,VI: Normal EOM. Saccades  V: Facial sensation: intact  VII: Facial strength: Normal, masked  VIII: Hearing: intact  IX: Palate elevates symmetrically  XI: Shoulder shrug: symmetric  XII: Tongue protrudes in midline    Motor Exam: Normal tone. Strength is  5 out of 5 in proximal and distal muscles of upper and lower extremities    MDS-UPDRS Motor Exam  Patient currently on Levodopa: no  Time of last dose: na     Speech: 1  Facial Expression: 2  Rigidity - Neck: 2  Rigidity - RUE: 2  Rigidity - LUE: 2  Rigidity - RLE: 1  Rigidity - LLE:  1  Finger tapping - R Hand: 1  Finger tapping - L Hand: 2  Hand movements - R Hand: 1  Hand movements - L Hand: 1  Pronation/supination - RH: 1  Pronation/supination - LH: 2  Toe tapping - R foot: 0  Toe tapping - L foot: 1  Leg agility - R le  Leg agility - L le  Arising from chair: 0  Gait: 1  Freezing of gait: 0  Postural stability: 0  Posture: 0  Global Spontaneity of movement: 1  Postural tremor - RH: 0  Postural tremor - LH: 0  Kinetic tremor - RH: 0  Kinetic tremor - LH: 0  Rest tremor amplitude - RUE: 0  Rest tremor amplitude - LUE: 1  Rest tremor amplitude - RLE: 0  Rest tremor amplitude - LLE: 0  Rest tremor amplitude - Lip/jaw: 0  Constancy of rest tremor: 1  Where dyskinesias present? No  Did these movements interfere with ratings? No  Oliver and Yahr Stage: 2    DTR: 3+ and symmetric.       Gait: Stands up and walk unassisted. Normal gait and postural reflexes    TESTS/IMAGING:         ASSESSMENT/PLAN:   Parkinson's disease:    Maintained on carbidopa levodopa immediate release, she takes 1.5 tablets 3 times daily, medication helps with her tremors, no side effects.  She had great benefit on LSVT big and loud, she continues to exercise regularly.  She had retired from work and has been focusing on her health now.  I will see her back in 6 months or sooner if needed    Thank you for allowing us to participate in your patient's care.  40 min visit , >50% counseling      Meds This Visit:  Requested Prescriptions      No prescriptions requested or ordered in this encounter       Imaging & Referrals:  None     ID#[id number]         [1]   Past Medical History:   Hemorrhoids    Migraine    Migraines   [2]   Past Surgical History:  Procedure Laterality Date    Colonoscopy      Dr. Cast    Hysterectomy  89    Guido biopsy stereo nodule 1 site left (cpt=19081)  2014    benign    Guido biopsy stereo nodule 2 site bilat (cpt=19081/05407) Left 2015    Benign    Other  89    dilation and curettage   [3]    Family History  Problem Relation Age of Onset    Hypertension Mother     Dementia Mother         Parkinson’s Disease    Hypertension Father     Breast Cancer Maternal Grandmother 70    Other (chronic kidney disease) Sister    [4]   Social History  Socioeconomic History    Marital status:    Tobacco Use    Smoking status: Never    Smokeless tobacco: Never   Vaping Use    Vaping status: Never Used   Substance and Sexual Activity    Alcohol use: Never    Drug use: Never   Other Topics Concern    Caffeine Concern Yes     Comment: 2 cups coffee AM    Stress Concern No    Weight Concern No    Special Diet No    Exercise Yes     Comment: walking, neck PT 2x weekly, cycling    Seat Belt Yes   [5]   Current Outpatient Medications   Medication Sig Dispense Refill    ALPRAZolam (XANAX) 0.25 MG Oral Tab Take 1 tablet (0.25 mg total) by mouth 2 (two) times daily as needed. 60 tablet 0    carbidopa-levodopa  MG Oral Tab Take 1.5 tablets by mouth 3 (three) times daily. 405 tablet 3   [6] No Known Allergies

## 2025-08-24 ENCOUNTER — PATIENT MESSAGE (OUTPATIENT)
Dept: FAMILY MEDICINE CLINIC | Facility: CLINIC | Age: 65
End: 2025-08-24

## 2025-08-24 DIAGNOSIS — F41.9 ANXIETY: ICD-10-CM

## 2025-08-27 RX ORDER — ALPRAZOLAM 0.25 MG
0.25 TABLET ORAL 2 TIMES DAILY PRN
Qty: 60 TABLET | Refills: 0 | Status: SHIPPED | OUTPATIENT
Start: 2025-08-27

## (undated) DEVICE — TROCAR: Brand: KII FIOS FIRST ENTRY

## (undated) DEVICE — DISPOSABLE LAPAROSCOPIC CLIP APPLIER WITH 20 CLIPS.: Brand: EPIX® UNIVERSAL CLIP APPLIER

## (undated) DEVICE — DRAPE C-ARM UNIVERSAL

## (undated) DEVICE — STERILE POLYISOPRENE POWDER-FREE SURGICAL GLOVES: Brand: PROTEXIS

## (undated) DEVICE — SOL  .9 1000ML BTL

## (undated) DEVICE — DERMABOND LIQUID ADHESIVE

## (undated) DEVICE — TISSUE RETRIEVAL SYSTEM: Brand: INZII RETRIEVAL SYSTEM

## (undated) DEVICE — Device

## (undated) DEVICE — TROCAR: Brand: KII® SLEEVE

## (undated) DEVICE — ENDOPATH 5MM CURVED SCISSORS WITH MONOPOLAR CAUTERY: Brand: ENDOPATH

## (undated) DEVICE — VISUALIZATION SYSTEM: Brand: CLEARIFY

## (undated) DEVICE — ENDOPATH ULTRA VERESS INSUFFLATION NEEDLES WITH LUER LOCK CONNECTORS: Brand: ENDOPATH

## (undated) DEVICE — MONOFILAMENT ABSORBABLE SUTURE: Brand: MAXON

## (undated) DEVICE — LIGHT HANDLE

## (undated) DEVICE — DRAPE HALF 40X58 DYNJP2410

## (undated) DEVICE — SUTURE MONOCRYL 4-0 PS-2

## (undated) DEVICE — ZIPWIRE GUIDEWIRE .035X150 STR

## (undated) DEVICE — LAP CHOLE/APPY CDS-LF: Brand: MEDLINE INDUSTRIES, INC.

## (undated) DEVICE — #11 STERILE BLADE: Brand: POLYMER COATED BLADES

## (undated) DEVICE — TIGERTAIL 5F FLXTIP 70CM

## (undated) DEVICE — KENDALL SCD EXPRESS SLEEVES, KNEE LENGTH, MEDIUM: Brand: KENDALL SCD

## (undated) NOTE — MR AVS SNAPSHOT
[place] [address] [address] [facility] [address] [phone]               Thank you for choosing us for your health care visit with Suzy Mahmood MD.  We are glad to serve you and happy to provide you with this summary of Essential hypertension    Routine general medical examination at a health care facility    Screening for endocrine, nutritional, metabolic and immunity disorder    Shoulder impingement syndrome    Visit for screening mammogram      Instructions and Inform bag. Wrap the bag in a towel before putting it on your shoulder. A frozen bag of peas or something similar can also be used as an ice pack. Use the ice packs 3 to 4 times a day for the next 2 days.  Continue using the ice to relieve of pain and swelling as Where to Get Your Medications      These medications were sent to [place] [address] [address] [place] [address] , [phone], [address] [place] [id number]     Phone:  [phone]

## (undated) NOTE — LETTER
04/09/18        [address] [facility]      Dear Hayden Avelar,    [address] records indicate that you have outstanding lab work and or testing that was ordered for you and has not yet been completed:          CBC W/DIFF

## (undated) NOTE — LETTER
Marie Sa[address]ra [address]  [address] [facility] [address], [address] [facility]  Authorization for Surgical Operation and Procedure     Date:___________                                                                                                         Time:__________ 4.   Should the need arise during my operation or immediate post-operative period, I also consent to the administration of blood and/or blood products.   Further, I understand that despite careful testing and screening of blood or blood products by benjamín 8.   I recognize that in the event my procedure results in extended X-Ray/fluoroscopy time, I may develop a skin reaction. 9.  If I have a Do Not Attempt Resuscitation (DNAR) order in place, that status will be suspended while in the operating room, proc 1. Wilman Boyer agree to be cared for by an anesthesiologist, who is specially trained to monitor me and give me medicine to put me to sleep or keep me comfortable during my procedure    I understand that my anesthesiologist is not an employee or 5. My doctor has explained to me other choices available to me for my care (alternatives).   6. Pregnant Patients (“epidural”):  I understand that the risks of having an epidural (medicine given into my back to help control pain during labor), include itchi

## (undated) NOTE — LETTER
Date: 5/28/2019    Patient Name: Kenna Hill          To Whom it may concern: The above patient was seen at the [facility] for treatment of a medical condition. This patient should be excused from attending work on 5/28/2019.

## (undated) NOTE — LETTER
Marie Sa[address]ra [address]  [address] [facility] [address], [address] [facility]  Authorization for Surgical Operation and Procedure     Date:___________                                                                                                         Time:__________ 4.   Should the need arise during my operation or immediate post-operative period, I also consent to the administration of blood and/or blood products.   Further, I understand that despite careful testing and screening of blood or blood products by benjamín 8.   I recognize that in the event my procedure results in extended X-Ray/fluoroscopy time, I may develop a skin reaction. 9.  If I have a Do Not Attempt Resuscitation (DNAR) order in place, that status will be suspended while in the operating room, proc 1. Ru Fu agree to be cared for by an anesthesiologist, who is specially trained to monitor me and give me medicine to put me to sleep or keep me comfortable during my procedure    I understand that my anesthesiologist is not an employee or 5. My doctor has explained to me other choices available to me for my care (alternatives).   6. Pregnant Patients (“epidural”):  I understand that the risks of having an epidural (medicine given into my back to help control pain during labor), include itchi

## (undated) NOTE — LETTER
Patient Name: Elvia Raymond  YOB: 1960          MRN :  [id number]  Date:  3/8/2025  Referring Physician:  Kim Bowers    Discharge Summary  Pt has attended 15 visits in Physical Therapy.     Dx: Parkinson's         Authorized # of Visits:  [id number]         Next MD visit: none scheduled  Fall Risk: standard         Precautions: n/a             Subjective: Patient states ready and willing to D/C from POC/LSVT BIG program this date, has no current concern regarding her ability to continue IND with maximal daily exercises. States feels that PT has been very helpful to her in that has given her confidence in her ability to continue to improve and delay decline associated with PD diagnosis. She has an IND home program in place that is appropriate for her current level, including community based activities such as boxing and cycling classes.     Objective:     (3/7/25):  Functional Mobility:  6MWT: 2057ft without rest break, mild inc WOB, RPE 3/10  9 Hole Peg Test: R (dominant): 17 sec; L 15.2 sec  High level tasks performed over prior sessions as listed including, but not limited to running all directions, SLS tasks on compliant surfaces, quick change of direction  HiMAT: opted not to perform for 3rd time due to appropriate performance last assessment     Balance:   SLS: >30 sec R/L   Tandem on airex with EC: >15 sec R/L back, inc sway R back    Strength:  5/5 throughout BLEs  : R 64.6#; L 58.8#      Assessment: Patient has made great progress with skilled PT intervention since SOC. At this time, she has met and surpassed all goals, as well as making improvements in all other tested objective measures at visit 10 and/or this date as compared to baseline. This is significant for patient as she did not demonstrate significant impairments at baseline, but was still able to further improve. Greatest level of improvement is highly important for this patient and this population in order to slow the decline  of neurodegenerative PD for as long as possible. This suggests that patient is highly responsive to exercise to reverse and delay detrimental symptoms allowing her to stay IND, living in her own home, without need for other care. At this time, she is not concerned at risk for falls based on any postural control or dynamic gait measures. Was able to complete HiMAT without issue. Demonstrates >10# improvement in R and L  strength from eval, 9 Hole Peg Test also improved beyond expected average for age. Patient is motivated and IND in performance of maximal daily exercises. She is already participating in community based exercise classes and home exercise program in addition to these exercises, has been educated in other community based PD programs as well. Feel that she will continue to do very well, but educated to return as appropriate, especially with any decline. Patient to D/C from PT per plan as listed below.      Goals: (To be met in 16 visits)  Patient will demonstrate improved ability to interpret vestibular cues by performing tandem on airex with EC x 10 sec. - MET  Patient will demonstrate ability to perform lateral shuffling and fwd/retro jogging without freezing or slowed speed. - MET  Patient will demonstrate improved fine motor coordination by improving R hand score on 9 Hole Peg Test by 5 sec or better. - MET  Patient will improve score on HiMAT MCID 3 pts or better to improve ability to perform high level activity like pickleball. - MET  Patient will be IND and compliant in HEP to maintain progress made in PT. -  MET        Plan: Patient to D/C from PT with continued IND HEP due to goals met, completion of LSVT BIG program. Should return in future as needed due to progressive nature of diagnosis.     Patient/Family/Caregiver was advised of these findings, precautions, and treatment options and has agreed to actively participate in planning and for this course of care.    Thank you for your  referral. If you have any questions, please contact me at Dept: [phone].    Sincerely,  Electronically signed by therapist: Farzana Robledo PT, DPT    Physician's certification required:  No    Certification From: 3/7/2025  To:6/5/2025 21st Century Cures Act Notice to Patient: Medical documents like this are made available to patients in the interest of transparency. However, be advised this is a medical document and it is intended as xpry-hu-alce communication between your medical providers. This medical document may contain abbreviations, assessments, medical data, and results or other terms that are unfamiliar. Medical documents are intended to carry relevant information, facts as evident, and the clinical opinion of the practitioner. As such, this medical document may be written in language that appears blunt or direct. You are encouraged to contact your medical provider and/or [facility] Patient Experience if you have any questions about this medical document.

## (undated) NOTE — LETTER
10/1/2020    Return to Work    Name: Lita Coles        : 1960    To Whom It May Concern,    Lita Coles had surgery on 2020 and is:    Able to return to work on 2020. No lifting greater than 20lbs.      Able to retu

## (undated) NOTE — LETTER
Date: 2/5/2025    Patient Name: Elvia JUNAID Raymond          To Whom it may concern:    This letter has been written at the patient's request. The above patient was seen at [facility] for treatment of a medical condition..    The patient may return to work/school on 02/10/25 with the following limitations none.      Sincerely,      Elvia Sierra, NP